# Patient Record
Sex: MALE | Race: WHITE | URBAN - METROPOLITAN AREA
[De-identification: names, ages, dates, MRNs, and addresses within clinical notes are randomized per-mention and may not be internally consistent; named-entity substitution may affect disease eponyms.]

---

## 2018-02-12 ENCOUNTER — TRANSFERRED RECORDS (OUTPATIENT)
Dept: HEALTH INFORMATION MANAGEMENT | Facility: CLINIC | Age: 28
End: 2018-02-12

## 2018-08-16 ENCOUNTER — TELEPHONE (OUTPATIENT)
Dept: RHEUMATOLOGY | Facility: CLINIC | Age: 28
End: 2018-08-16

## 2018-08-16 NOTE — TELEPHONE ENCOUNTER
[facility]    Phone Message    May a detailed message be left on voicemail: yes    Reason for Call: Symptoms or Concerns     If patient has red-flag symptoms, warm transfer to triage line    Current symptom or concern: Ulcerative Colitis    Symptoms have been present for:  several years(s)    Has patient previously been seen for this? Yes    By Out of State    Date: 8/16/18    Are there any new or worsening symptoms? No      Action Taken: Message routed to:  [facility]): Pt would like to be seen in the Rheum clinic for Ulcerative Colitis - Med. records are being Faxed to the clinic by the [facility] - I explained the review process that he will get a call for the intake then at the end of the review, approx. 3 weeks will get a second call from the clinic

## 2018-08-21 NOTE — TELEPHONE ENCOUNTER
The Rheumatology Clinic is only accepting physician referrals at this time. If the patient would like to be seen in our clinic, they will need to contact their physician to obtain a referral and send it to the clinic. Left a message of this informatioon.  Dari Chinchilla CMA  8/21/2018 3:24 PM

## 2018-08-30 NOTE — TELEPHONE ENCOUNTER
[facility] Call [facility]    Phone Message    May a detailed message be left on voicemail: yes    Reason for Call: Other: Per pt, just following up on referral. Aside from the [facility], he stated [facility] is sending some medical records and referral as well. Please follow up.     Action Taken: Message routed to:  [facility] ([facility]): Rheum

## 2018-08-31 NOTE — TELEPHONE ENCOUNTER
Call was placed to patient regarding the referral we received for ulcerative colitis from Jose Morrison at [facility], however, there was no answer. Message was left asking patient to call the clinic back to complete an intake form and discuss if there are outside records needed. Awaiting a call back from the patient.  Dari Chinchilla CMA  8/31/2018 2:30 PM

## 2018-09-04 NOTE — TELEPHONE ENCOUNTER
[facility]    Phone Message    May a detailed message be left on voicemail: yes    Reason for Call: Other: Per pt, returning Dari's call. Please follow up at your earliest convenience.     Action Taken: Message routed to:  [facility]): Rheum

## 2018-09-06 NOTE — TELEPHONE ENCOUNTER
[facility]    Phone Message    May a detailed message be left on voicemail: yes    Reason for Call: Other: The pt is returning Dari's call for an intake. He is in meetings until 3:30 pm today - could you call after 3:30 today? Thanks! \     Action Taken: Message routed to:  [facility]): [facility] rheum

## 2018-09-07 NOTE — TELEPHONE ENCOUNTER
[facility]    Phone Message    May a detailed message be left on voicemail: yes    Reason for Call: Other: Pt returned Dari's call, this was his 4th call but I was told to take a message. Please call him as soon as possible on Mon.     Action Taken: Message routed to:  [facility]): see above

## 2018-09-10 NOTE — TELEPHONE ENCOUNTER
[facility]    Phone Message    May a detailed message be left on voicemail: yes    Reason for Call: patient is calling back returning a call from Dari, He wanted to let you know he is in the middle of a flair up and would like to be seen soon if possible     Action Taken: Message routed to:  [facility]): RHEUM

## 2018-09-10 NOTE — TELEPHONE ENCOUNTER
Spoke with patient and let know that this may not be something that we see in Rheumatology and we will need his records to review. Patient stated that all his care for this was done at the [facility] which we are able to view via Care Everywhere. I have emailed a EUNICE so this can be completed. Patient agreed with this plan.  Dari Chinchilla CMA  9/10/2018 4:24 PM

## 2018-09-10 NOTE — TELEPHONE ENCOUNTER
[facility]    Phone Message    May a detailed message be left on voicemail: yes    Reason for Call: Other: Patient is calling to try and catch Dari before the end of the day, please call to follow up for intake.      Action Taken: Message routed to:  [facility] Rheum

## 2018-09-10 NOTE — TELEPHONE ENCOUNTER
Call placed to patient regarding the referral without success. Message left.   Dari Chinchilla CMA  9/10/2018 3:16 PM

## 2018-09-11 NOTE — TELEPHONE ENCOUNTER
Called patient regarding the referral without success. I have received the EUNICE via email. I have some questions that I would like to discuss with the patient. Awaiting a callback from patient.   Dari Chinchilla CMA  9/11/2018 12:14 PM

## 2018-09-11 NOTE — TELEPHONE ENCOUNTER
[facility]    Phone Message    May a detailed message be left on voicemail: yes    Reason for Call: Other: Pt returning call from Dari. He states he is available between 12:30 and 1:30, and is available again after 3:30. Please advise.     Action Taken: Message routed to:  [facility] Rheum

## 2018-09-12 NOTE — TELEPHONE ENCOUNTER
Per our Rheumatology Team, this diagnosis is not something that our providers manage unless there is joint pain involved. I spoke to patient and inquired of this and he stated that he does not. I let him know that his provider will have to refer him to GI for management of the ulcerative colitis.   Dari Chinchilla CMA  9/12/2018 3:42 PM

## 2018-09-14 ENCOUNTER — TELEPHONE (OUTPATIENT)
Dept: GASTROENTEROLOGY | Facility: CLINIC | Age: 28
End: 2018-09-14

## 2018-09-14 NOTE — TELEPHONE ENCOUNTER
[facility] Call Center    Phone Message    May a detailed message be left on voicemail: yes    Reason for Call: Other: Patient is scheduled first available for Ulcerative Colitis. [place] and [place] care. Patient has been trying to get into the clinic since August and was incorrectly routed to the wrong department, only to be told that they do not see for this diagnosis. Patient is currently having a flare up and at no fault of his own is just now getting scheduled. Patient needs to be seen within a month per guidelines, but would like to be seen sooner if possible. Please call patient .      Action Taken: Message routed to:  [facility] GI

## 2018-09-17 ENCOUNTER — TELEPHONE (OUTPATIENT)
Dept: GASTROENTEROLOGY | Facility: CLINIC | Age: 28
End: 2018-09-17

## 2018-09-18 ENCOUNTER — TELEPHONE (OUTPATIENT)
Dept: GASTROENTEROLOGY | Facility: CLINIC | Age: 28
End: 2018-09-18

## 2018-09-18 ENCOUNTER — OFFICE VISIT (OUTPATIENT)
Dept: GASTROENTEROLOGY | Facility: CLINIC | Age: 28
End: 2018-09-18
Payer: COMMERCIAL

## 2018-09-18 VITALS
TEMPERATURE: 97.9 F | OXYGEN SATURATION: 98 % | DIASTOLIC BLOOD PRESSURE: 74 MMHG | RESPIRATION RATE: 16 BRPM | SYSTOLIC BLOOD PRESSURE: 125 MMHG | HEART RATE: 69 BPM | BODY MASS INDEX: 26 KG/M2 | WEIGHT: 196.2 LBS | HEIGHT: 73 IN

## 2018-09-18 DIAGNOSIS — K51.911 ULCERATIVE COLITIS WITH RECTAL BLEEDING, UNSPECIFIED LOCATION (H): ICD-10-CM

## 2018-09-18 DIAGNOSIS — Z92.241 H/O SYSTEMIC STEROID THERAPY: ICD-10-CM

## 2018-09-18 DIAGNOSIS — K51.911 ULCERATIVE COLITIS WITH RECTAL BLEEDING, UNSPECIFIED LOCATION (H): Primary | ICD-10-CM

## 2018-09-18 LAB
ALBUMIN SERPL-MCNC: 4.2 G/DL (ref 3.4–5)
ALP SERPL-CCNC: 38 U/L (ref 40–150)
ALT SERPL W P-5'-P-CCNC: 25 U/L (ref 0–70)
ANION GAP SERPL CALCULATED.3IONS-SCNC: 5 MMOL/L (ref 3–14)
AST SERPL W P-5'-P-CCNC: 9 U/L (ref 0–45)
BASOPHILS # BLD AUTO: 0 10E9/L (ref 0–0.2)
BASOPHILS NFR BLD AUTO: 0.3 %
BILIRUB DIRECT SERPL-MCNC: 0.3 MG/DL (ref 0–0.2)
BILIRUB SERPL-MCNC: 1.2 MG/DL (ref 0.2–1.3)
BUN SERPL-MCNC: 18 MG/DL (ref 7–30)
CALCIUM SERPL-MCNC: 9 MG/DL (ref 8.5–10.1)
CHLORIDE SERPL-SCNC: 104 MMOL/L (ref 94–109)
CO2 SERPL-SCNC: 30 MMOL/L (ref 20–32)
CREAT SERPL-MCNC: 0.9 MG/DL (ref 0.66–1.25)
CRP SERPL-MCNC: <2.9 MG/L (ref 0–8)
DIFFERENTIAL METHOD BLD: NORMAL
EOSINOPHIL # BLD AUTO: 0 10E9/L (ref 0–0.7)
EOSINOPHIL NFR BLD AUTO: 0.1 %
ERYTHROCYTE [DISTWIDTH] IN BLOOD BY AUTOMATED COUNT: 12.6 % (ref 10–15)
ERYTHROCYTE [SEDIMENTATION RATE] IN BLOOD BY WESTERGREN METHOD: 3 MM/H (ref 0–15)
GFR SERPL CREATININE-BSD FRML MDRD: >90 ML/MIN/1.7M2
GLUCOSE SERPL-MCNC: 99 MG/DL (ref 70–99)
HCT VFR BLD AUTO: 45.5 % (ref 40–53)
HGB BLD-MCNC: 15.1 G/DL (ref 13.3–17.7)
IMM GRANULOCYTES # BLD: 0.1 10E9/L (ref 0–0.4)
IMM GRANULOCYTES NFR BLD: 0.6 %
LYMPHOCYTES # BLD AUTO: 1.2 10E9/L (ref 0.8–5.3)
LYMPHOCYTES NFR BLD AUTO: 13.2 %
MCH RBC QN AUTO: 30.9 PG (ref 26.5–33)
MCHC RBC AUTO-ENTMCNC: 33.2 G/DL (ref 31.5–36.5)
MCV RBC AUTO: 93 FL (ref 78–100)
MONOCYTES # BLD AUTO: 0.8 10E9/L (ref 0–1.3)
MONOCYTES NFR BLD AUTO: 8.6 %
NEUTROPHILS # BLD AUTO: 6.9 10E9/L (ref 1.6–8.3)
NEUTROPHILS NFR BLD AUTO: 77.2 %
NRBC # BLD AUTO: 0 10*3/UL
NRBC BLD AUTO-RTO: 0 /100
PLATELET # BLD AUTO: 181 10E9/L (ref 150–450)
POTASSIUM SERPL-SCNC: 4.4 MMOL/L (ref 3.4–5.3)
PROT SERPL-MCNC: 7.3 G/DL (ref 6.8–8.8)
RBC # BLD AUTO: 4.88 10E12/L (ref 4.4–5.9)
SODIUM SERPL-SCNC: 139 MMOL/L (ref 133–144)
WBC # BLD AUTO: 8.9 10E9/L (ref 4–11)

## 2018-09-18 RX ORDER — MESALAMINE 800 MG/1
2400 TABLET, DELAYED RELEASE ORAL
COMMUNITY
End: 2018-11-20

## 2018-09-18 ASSESSMENT — PAIN SCALES - GENERAL: PAINLEVEL: NO PAIN (0)

## 2018-09-18 ASSESSMENT — ENCOUNTER SYMPTOMS
DIARRHEA: 0
HEARTBURN: 0
BOWEL INCONTINENCE: 1
CONSTIPATION: 0
RECTAL PAIN: 0
NAUSEA: 0
BLOATING: 0
VOMITING: 0
BLOOD IN STOOL: 1
JAUNDICE: 0
ABDOMINAL PAIN: 0

## 2018-09-18 NOTE — PROGRESS NOTES
[facility] UC NEW       PATIENT: Derek Otto Jr.    MRN: [id number]    Date of Birth 1990    Tel: [phone] (home)     PCP: No Ref-Primary, Physician     HPI: Mr. Otto is a 27 year old year old male here to establish care for UC; he recently moved to [place] from [place].     UC history  [place] year of [place] ([place] developed diarrhea (may not have been bloody) and lost 20 lb. Presented to PCP who advised a liquid diet, which did not help. Was seen by a GI doc in [place] (Dr. Bacon) who performed a cscope that showed UC (extent and severity unknown).  Started on prednisone and Asacol. That summer, things improved. In the Fall, he had worsening symptoms and 6MP was started + Asacol +prednisone.  In 2012, saw Dr. Jay Jay Ayala at [facility] who lowered the 6MP dose. Symptoms were generally manageable until a flare in early 2014, restarted prednisone. In 6/2014 started the SCD (+ medications) and did well. Cscope in 2016 looked good. Flared in 2017 and 6/2018 and now, all managed with prednisone.  Currently on 30 mg prednisone, scheduled to decrease to 20 mg on Monday.     [place]aine moved to [place] 7/1/2018 (g/f is a med/peds resident here).       PCP located at [facility].     Current UC symptoms  Bowel frequency in day 1-2 (1 week ago was 3-6 times per day, with a lot of blood and urgency)  Bowel frequency in night 0  Urgency of defecation YES some  Blood in stool minimal   General well being 1 = slightly below average  Extracolonic features (multiple select) none    Constitutional symptoms:  Fever NO  Weight loss NO    Other GI symptoms present non    Total number of IBD surgeries (except perianal): 0    Current IBD Medications:  6MP 50 mg daily  Prednisone 30 mg daily  Asacol 4.8 g daily    Past IBD Medications:   6MP 50 mg daily  Prednisone 30 mg daily  Asacol 4.8 g daily    Past Medical History:   Diagnosis Date     Ulcerative colitis (H)         Past  "Surgical History:   Procedure Laterality Date     HC TOOTH EXTRACTION W/FORCEP  2005       Social History   Substance Use Topics     Smoking status: Never Smoker     Smokeless tobacco: Never Used     Alcohol use 1.2 - 3.6 oz/week     1 - 3 Shots of liquor, 1 - 3 Glasses of wine per week      Comment: Socially       Family History   Problem Relation Age of Onset     Ulcerative Colitis Maternal Grandfather      Autoimmune Disease No family hx of      Colon Cancer No family hx of        Allergies   Allergen Reactions     Erythromycin Rash     Patient reports happened once as a child.        Outpatient Encounter Prescriptions as of 9/18/2018   Medication Sig Dispense Refill     MERCAPTOPURINE PO Take 50 mg by mouth       mesalamine (ASACOL HD) 800 MG EC tablet Take 2,400 mg by mouth 2 times daily       PREDNISONE PO Take 30 mg by mouth daily       No facility-administered encounter medications on file as of 9/18/2018.       NSAID  NO    Review of Systems  Complete 10 System ROS performed. All are negative except as documented below, in the HPI, or in patient questionnaire from today's visit.    1) Constitutional: No fevers, chills, night sweats or malaise, weight loss or gain  2) Skin: No rash  3) Pulmonary: No wheeze, SOB, cough, sputum or hemoptysis  4) Cardiovascular: No Chest pain or palpitations  5) Genitourinary: No blood in urine or dysuria  6) Endocrine: No increased sweating, hunger, thirst or thyroid problems  7) Hematologic: No bruising and easy bleeding  8) Musculoskeletal: no new pain in joints or limitation in ROM  9) Neurologic: No dizziness, paresthesias or weakness or falls  10) Psychiatric:  not depressed/anxious, no sleep problems    PHYSICAL EXAM  Vitals: /74 (BP Location: Left arm, Patient Position: Sitting, Cuff Size: Adult Large)  Pulse 69  Temp 97.9  F (36.6  C) (Oral)  Resp 16  Ht 1.855 m (6' 1.03\")  Wt 89 kg (196 lb 3.2 oz)  SpO2 98%  BMI 25.86 kg/m2    No Pain (0)      Eyes: " Sclera anicteric/injected  Ears/nose/mouth/throat: Normal oropharynx without ulcers or exudate, mucus membranes moist, hearing intact  Neck: supple, thyroid normal size  CV: No edema, RRR  Respiratory: Unlabored breathing  Lymph: No axillary, submandibular, supraclavicular or inguinal lymphadenopathy  Abd:  Nondistended, +bs, no hepatosplenomegaly, nontender, no peritoneal signs  Skin: warm, perfused, no jaundice  Psych: Normal affect  MSK: Normal gait     DATA:  Reviewed in detail past documentation, medications and prior workup available in electronic health records or through outside records.    PERTINENT STUDIES:  Endoscopy:   Not available  Last cscope in 2016 (Dr. Ayala, [facility]) -- showed healing.     Imaging:  May have had an MRE ([place], [place]) in ~2015. Not sure result.     IMPRESSION:  Mr. Otto is a 27 year old year old here with UC, currently experiencing a flare that is responsive to steroids.  He is on 6MP and Asacol as well.  We will plan to expedite a colonoscopy for disease severity and extent assessment. Will continue prednisone taper for now. Will start Entyvio, given he has required several prednisone courses and is clearly refractory to his current regimen of 6MP/5-ASA. Of note, he 6MP dose is low but he would like to get off this medication anyway, given risk for hepatosplenic T cell lymphoma in young males.       # Moderate/severe UC on 6MP/5-ASA, diagnosed 6 years ago with unknown extent, currently with steroid responsive flare    PLAN:  --Referral for ileocolonoscopy (scheduled for Monday)  --Labs today  --Stool studies + fecal calprotectin  --Continue prednisone taper   --Start Entyvio. Will overlap with 6MP for now.  --Referral to Scott Yeboah, PhD for nutrition   --DEXA bone scan given history of long term prednisone use (cumulative more than 3 months)  --Get flu shot this season, also get the pneumococcal vaccines (13 + 23)  --Establish care with PCP at the  to avoid disjointed  care  --We will obtain outside records    The information and safety profile of Vedolizumab (Entyvio) was reviewed with the patient: fixed dose of 300 mg given via IV infusion over 30 minutes. Induction dosing is administered at week 0, 2, and 6 and then every 8 weeks for maintenance therapy. Except for possible nasalpharyngitis, there is no increased risk of serious infections. Patient advised to take normal precautions in preventing colds: washing hands and keeping hands away from face. Additionally, there is no increased risk of cancer. Lastly, although some superficial similarity to Tysabri, there have been no cases of progressive multifocal leukoencephalopathy (PML) seen in clinical trials and most likely no risk.    Misc:  -- Continue to avoid tobacco use  -- Avoid NSAIDs as there is potentially a 25% chance of causing an IBD flare    Return in about 8 weeks (around 11/13/2018). with myself or YASMANY Sun MD   of Medicine  Division of Gastroenterology, Hepatology and Nutrition  [facility]    September 18, 2018    I spent a total of 60 minutes, face to face, was spent with this patient, >50% of which was counseling regarding the above delineated issues.

## 2018-09-18 NOTE — PATIENT INSTRUCTIONS
PLAN  --Referral for ileocolonoscopy  --Labs  --Stool studies + fecal calprotectin  --Continue prednisone taper   --Start Entyvio. Will overlap with 6MP for now.  --We will obtain outside records  --Referral to Scott Yeboah, PhD for nutrition   --DEXA bone scan  --Get flu shot this season, also get the pneumococcal vaccines (13 + 23)  --Establish care with PCP at the   --Sign up for MyChart    Return for follow up in 6-8 weeks with myself or with YASMANY Sun.     For questions regarding your care Monday through Friday, contact the RN GI care coordinator, Sapna Guzman at [phone] and your call will be returned within 24 hours. If you have a more immediate medical need call   [phone] . Your call will be returned same day, or if consultation is needed with the provider, it may be following business day. You may also send  a My Chart message to your provider and it will be answered in a timely fashion. If in need of assistance after hours, holidays or weekends please call [phone] and have the on call GI fellow paged.        For medication refills (prescribed by the GI clinic), contact your pharmacy.  In order for your refill to be processed in a timely fashion, it is your responsibility to ensure you follow the recommendations from your provider regarding your laboratory studies and follow up appointments     For appointment rescheduling/cancellation, contact [phone]      After hours, holidays, or if you have an immediate GI concern and cannot wait for a return call, contact the GI Fellow at [phone] and select option #4.           To reschedule your colonoscopy:      [phone] - [facility]  [address] , [address]  ([facility] [place] [place] [address]   [phone]  QUESTIONS ABOUT YOUR PREP CALL BRIANNE [phone]                                                      DIRECTIONS FOR COLON PREP PRIOR TO COLONOSCOPY:    One week before your  colonoscopy:    Your  Prep will be  sent to pharmacy of choice    - Talk to your primary  doctor if you take blood thinners such as aspirin, Coumadin, or Plavix.  This medication may need to be changed before the test.  If you have diabetes, ask your doctor if you should change your diet or medications.    - If you have diabetes, ask to have your exam early in the morning.     - Stop taking fiber supplements, multi-vitamins with iron and medicines that contain iron.    -You may also want to buy Tucks wipes, Vaseline, or other items. (see below-Tips for Colon Cleansing).    - Arrange for a  to take you to and from the procedure.    Three days before your colonoscopy:    - Begin a low fiber diet.  No raw fruits or vegetables, whole wheat, popcorn or other high fiber foods.  (see chart below).  Stop bulking agents such as bran, Metamucil, Fibercon.  No olestra (fat substitute).    Two days before your colonoscopy:    - Drink at least 4-6 glasses of sports drink (no red or purple liquids) each day.    - Stop taking these medicines:  ibuproprofen (Advil, Motrin), Clinoril, Feldene, Naprosyn, Aleve or other NSAID medication.  You may take Tylenol for pain.    The day before your colonoscopy:    - No more solid food.  Begin drinking clear liquids (see list below).  Drink at least 8-10 full glasses of clear liquids during the day.    -LOW FIBER DIET:    Starches:  White bread, rolls, biscuits, croissants, Chelsey toast, white flour tortillas, waffles, pancakes, white Citizen of [place] toast, white rice, noodles, pasta, macaroni, cooked and peeled potatoes, plain crackers, saltines, cooked farina or cream of rice, puffed rice, corn flakes, Rice Krispies, Special K.    Vegetables:  Tender cooked and canned vegetables without seeds, carrots, asparagus tips, green beans, wax beans, spinach, vegetable broth.  NO raw vegetables.    Fruits and fruit juices:  Strained fruit juice, canned fruit without skin or seeds (no pineapple),  applesauce, pear sauce, ripe bananas, melons (no watermelon)    Milk products:  Milk (plain or flavored), cheese, cottage cheese, yogurt (no berries), custard, ice cream (no nuts)    Proteins:  Tender, well-cooked ground beef, lamb, veal, ham, pork, chicken, turkey, fish, or organ meats, eggs, creamy peanut butter    Fats and Condiments:  Margarine, butter, mayonnaise, sour cream, salad dressing, plain gravy, spices, cooked herbs, bouillon, sugar, clear jelly, honey, syrup    Snacks, sweets, and drinks: Pretzels, hard candy, plain cakes and cookies (no nuts or seeds), jello, plain pudding, sherbet, Popsicles, coffee, tea, soda, pop.    CLEAR LIQUID DIET:    Water, tea, coffee (no milk or cream)    Soda pop, Gatorade or other sports drinks (no red or purple)    Clear nutrition drinks (Enlive, Resource Breeze)    Jello, Popsicles (no milk or fruit pieces), sorbet (no red or purple)    Fat free soup broth or bouillon    Plain hard candy, such as lifesavers (no red or purple)    Clear juices and fruit flavored drinks such as apple juice, white grape juice, Hi-C, Molina-Aid (no red or purple)    TIPS FOR COLON CLEANSING:    - Use alcohol free baby wipes to ease anal irritation.  You may also use Desitin cream or Vaseline to help protect the skin. Other options include Tucks wipes, hemorrhoid creams, or hydrocortisone cream.    - You will have diarrhea, and may also have chills.  Dress comfortably.    - Expect to feel discomfort until the stool clears from your colon.    - If you followed the instructions and your stool is clear or yellow liquid, you are ready for the exam.    - If you are not sure if your colon is clean, please call the numbers above and ask to speak to an endoscopy nurse.      MIRALAX/GATORADE    FIVE DAYS BEFORE    -Stop taking any of the following over-the-counter medications: Ibuprofen, Asprin,Iron supplements.    -If you are taking a blood thinner medication such as Coumadin, Plavix, or Warfarin, you  MUST contact the prescribing physician regarding clearance for this procedure, or instructions for stopping/changing this medication before your procedure. -Please contact the nurse line at [phone] option #3 for any questions regarding other medications.       THREE DAYS BEFORE     Begin restricted low-residue diet.  Suggested foods include: white bread or rolls, plain crackers, white rice, skinless potatoes, low fiber cereals, chicken, turkey, fish or seafood, and eggs.  You may also have applesauce, pear sauce, soft honeydew, cantaloupe, ripe banana, canned fruit without seeds or skins.      Do not eat: Corn (any form), raw vegetables, foods with seeds, whole wheat or grain breads and cereals, brown or wild rice, granola, raisins and dried fruit, berries, popcorn, all varieties of nuts, peanuts, and seeds.     Please go to your local pharmacy or store and purchase:    - 8.3 oz bottle of  Miralax (Powder)  - 10 oz bottle of Magnesium Citrate  -  64 oz of Gatorade (no red or purple)    THE DAY BEFORE YOUR PROCEDURE    - Begin allowed clear liquid diet at breakfast time.    - Mix Miralax and 64 oz. of Gatorade in a pitcher, and place in the fridge.      ALLOWED CLEAR LIQUIDS  -Water    -Regular or decaf coffee (no cream)    -Jell-O or popsicles (no red)    -Plain hard candy (no red)    -Clear juices or Gatorade (no red or purple)    -Chicken broth (no vegetables or noodles)    DO NOT DRINK  -Milk or mild products such as ice cream, malts, or shakes    -Red juices of any kind    -Molina-aid, cranberry, or grape juice    -Juice with pulp (orange, grapefruit, pineapple,, or tomato)    -Cream soups of any kind    -Alcoholic beverages    4:00 P.M.- 6:00 P.M.-  Drink one eight (8) ounce glass of Miralax/Gatorade mixture every 30 minutes until the mixture is gone.  If you start to feel nauseous, you may space out your drinking intervals to every 45 minutes    8:00 P.M.- 10:00 P.M.- Drink the bottle of Magnesium  Citrate.    You may have clear liquids up to 3 hours before procedure time          .

## 2018-09-18 NOTE — TELEPHONE ENCOUNTER
HANANE for medical records team at the [facility] in regards to patient records. Asked medical records team to return my phone call so we can get proper records sent to our clinic.

## 2018-09-18 NOTE — NURSING NOTE
"Chief Complaint   Patient presents with     Consult     Ulcerative Colitis       Vitals:    09/18/18 1500   BP: 125/74   BP Location: Left arm   Patient Position: Sitting   Cuff Size: Adult Large   Pulse: 69   Resp: 16   Temp: 97.9  F (36.6  C)   TempSrc: Oral   SpO2: 98%   Weight: 89 kg (196 lb 3.2 oz)   Height: 1.855 m (6' 1.03\")       Body mass index is 25.86 kg/(m^2).      Pebbles Gamble LPN                          "

## 2018-09-18 NOTE — LETTER
9/18/2018       RE: Derek Otto Jr.  [address]  [address]  [place] [facility] [address]     Dear Colleague,    Thank you for referring your patient, Derek Otto Jr., to the [facility] GASTROENTEROLOGY AND IBD CLINIC at [facility]. Please see a copy of my visit note below.    [facility] UC NEW       PATIENT: Derek Otto Jr.    MRN: [id number]    Date of Birth 1990    Tel: [phone] (home)     PCP: No Ref-Primary, Physician     HPI: Mr. Otto is a 27 year old year old male here to establish care for UC; he recently moved to [place] from [place].     UC history  [place] year of [place] ([employer] [place] developed diarrhea (may not have been bloody) and lost 20 lb. Presented to PCP who advised a liquid diet, which did not help. Was seen by a GI doc in [place] (Dr. Bacon) who performed a cscope that showed UC (extent and severity unknown).  Started on prednisone and Asacol. That summer, things improved. In the Fall, he had worsening symptoms and 6MP was started + Asacol +prednisone.  In 2012, saw Dr. Jay Jay Ayala at [facility] who lowered the 6MP dose. Symptoms were generally manageable until a flare in early 2014, restarted prednisone. In 6/2014 started the SCD (+ medications) and did well. Cscope in 2016 looked good. Flared in 2017 and 6/2018 and now, all managed with prednisone.  Currently on 30 mg prednisone, scheduled to decrease to 20 mg on Monday.     D[place]aine moved to [place] 7/1/2018 (g/f is a med/peds resident here).       PCP located at [facility].     Current UC symptoms  Bowel frequency in day 1-2 (1 week ago was 3-6 times per day, with a lot of blood and urgency)  Bowel frequency in night 0  Urgency of defecation YES some  Blood in stool minimal   General well being 1 = slightly below average  Extracolonic features (multiple select) none    Constitutional symptoms:  Fever NO  Weight loss NO    Other GI symptoms present  non    Total number of IBD surgeries (except perianal): 0    Current IBD Medications:  6MP 50 mg daily  Prednisone 30 mg daily  Asacol 4.8 g daily    Past IBD Medications:   6MP 50 mg daily  Prednisone 30 mg daily  Asacol 4.8 g daily    Past Medical History:   Diagnosis Date     Ulcerative colitis (H)         Past Surgical History:   Procedure Laterality Date     HC TOOTH EXTRACTION W/FORCEP  2005       Social History   Substance Use Topics     Smoking status: Never Smoker     Smokeless tobacco: Never Used     Alcohol use 1.2 - 3.6 oz/week     1 - 3 Shots of liquor, 1 - 3 Glasses of wine per week      Comment: Socially       Family History   Problem Relation Age of Onset     Ulcerative Colitis Maternal Grandfather      Autoimmune Disease No family hx of      Colon Cancer No family hx of        Allergies   Allergen Reactions     Erythromycin Rash     Patient reports happened once as a child.        Outpatient Encounter Prescriptions as of 9/18/2018   Medication Sig Dispense Refill     MERCAPTOPURINE PO Take 50 mg by mouth       mesalamine (ASACOL HD) 800 MG EC tablet Take 2,400 mg by mouth 2 times daily       PREDNISONE PO Take 30 mg by mouth daily       No facility-administered encounter medications on file as of 9/18/2018.       NSAID  NO    Review of Systems  Complete 10 System ROS performed. All are negative except as documented below, in the HPI, or in patient questionnaire from today's visit.    1) Constitutional: No fevers, chills, night sweats or malaise, weight loss or gain  2) Skin: No rash  3) Pulmonary: No wheeze, SOB, cough, sputum or hemoptysis  4) Cardiovascular: No Chest pain or palpitations  5) Genitourinary: No blood in urine or dysuria  6) Endocrine: No increased sweating, hunger, thirst or thyroid problems  7) Hematologic: No bruising and easy bleeding  8) Musculoskeletal: no new pain in joints or limitation in ROM  9) Neurologic: No dizziness, paresthesias or weakness or falls  10) Psychiatric:   "not depressed/anxious, no sleep problems    PHYSICAL EXAM  Vitals: /74 (BP Location: Left arm, Patient Position: Sitting, Cuff Size: Adult Large)  Pulse 69  Temp 97.9  F (36.6  C) (Oral)  Resp 16  Ht 1.855 m (6' 1.03\")  Wt 89 kg (196 lb 3.2 oz)  SpO2 98%  BMI 25.86 kg/m2    No Pain (0)      Eyes: Sclera anicteric/injected  Ears/nose/mouth/throat: Normal oropharynx without ulcers or exudate, mucus membranes moist, hearing intact  Neck: supple, thyroid normal size  CV: No edema, RRR  Respiratory: Unlabored breathing  Lymph: No axillary, submandibular, supraclavicular or inguinal lymphadenopathy  Abd:  Nondistended, +bs, no hepatosplenomegaly, nontender, no peritoneal signs  Skin: warm, perfused, no jaundice  Psych: Normal affect  MSK: Normal gait     DATA:  Reviewed in detail past documentation, medications and prior workup available in electronic health records or through outside records.    PERTINENT STUDIES:  Endoscopy:   Not available  Last cscope in 2016 (Dr. Ayala, [facility]) -- showed healing.     Imaging:  May have had an MRE ([place], [place]) in ~2015. Not sure result.     IMPRESSION:  Mr. Otto is a 27 year old year old here with UC, currently experiencing a flare that is responsive to steroids.  He is on 6MP and Asacol as well.  We will plan to expedite a colonoscopy for disease severity and extent assessment. Will continue prednisone taper for now. Will start Entyvio, given he has required several prednisone courses and is clearly refractory to his current regimen of 6MP/5-ASA. Of note, he 6MP dose is low but he would like to get off this medication anyway, given risk for hepatosplenic T cell lymphoma in young males.       # Moderate/severe UC on 6MP/5-ASA, diagnosed 6 years ago with unknown extent, currently with steroid responsive flare    PLAN:  --Referral for ileocolonoscopy (scheduled for Monday)  --Labs today  --Stool studies + fecal calprotectin  --Continue prednisone taper   --Start " Entyvio. Will overlap with 6MP for now.  --Referral to Scott Yeboah, PhD for nutrition   --DEXA bone scan given history of long term prednisone use (cumulative more than 3 months)  --Get flu shot this season, also get the pneumococcal vaccines (13 + 23)  --Establish care with PCP at the  to avoid disjointed care  --We will obtain outside records    The information and safety profile of Vedolizumab (Entyvio) was reviewed with the patient: fixed dose of 300 mg given via IV infusion over 30 minutes. Induction dosing is administered at week 0, 2, and 6 and then every 8 weeks for maintenance therapy. Except for possible nasalpharyngitis, there is no increased risk of serious infections. Patient advised to take normal precautions in preventing colds: washing hands and keeping hands away from face. Additionally, there is no increased risk of cancer. Lastly, although some superficial similarity to Tysabri, there have been no cases of progressive multifocal leukoencephalopathy (PML) seen in clinical trials and most likely no risk.    Misc:  -- Continue to avoid tobacco use  -- Avoid NSAIDs as there is potentially a 25% chance of causing an IBD flare    Return in about 8 weeks (around 11/13/2018). with myself or YASMANY Sun MD   of Medicine  Division of Gastroenterology, Hepatology and Nutrition  [facility]    September 18, 2018    I spent a total of 60 minutes, face to face, was spent with this patient, >50% of which was counseling regarding the above delineated issues.              Again, thank you for allowing me to participate in the care of your patient.      Sincerely,    Dorys Ruiz MD

## 2018-09-18 NOTE — MR AVS SNAPSHOT
After Visit Summary   9/18/2018    Derek Otto Jr.    MRN: [id number]           Patient Information     Date Of Birth          1990        Visit Information        Provider Department      9/18/2018 3:00 PM Dorys Ruiz MD [facility] Gastroenterology and IBD Clinic        Today's Diagnoses     Ulcerative colitis with rectal bleeding, unspecified location (H)    -  1    H/O systemic steroid therapy          Care Instructions    PLAN  --Referral for ileocolonoscopy  --Labs  --Stool studies + fecal calprotectin  --Continue prednisone taper   --Start Entyvio. Will overlap with 6MP for now.  --We will obtain outside records  --Referral to Scott Yeboah, PhD for nutrition   --DEXA bone scan  --Get flu shot this season, also get the pneumococcal vaccines (13 + 23)  --Establish care with PCP at the   --Sign up for MyChart    Return for follow up in 6-8 weeks with myself or with YASMANY Sun.     For questions regarding your care Monday through Friday, contact the RN GI care coordinator, Sapna Guzman at [phone] and your call will be returned within 24 hours. If you have a more immediate medical need call   [phone] . Your call will be returned same day, or if consultation is needed with the provider, it may be following business day. You may also send  a My Chart message to your provider and it will be answered in a timely fashion. If in need of assistance after hours, holidays or weekends please call [phone] and have the on call GI fellow paged.        For medication refills (prescribed by the GI clinic), contact your pharmacy.  In order for your refill to be processed in a timely fashion, it is your responsibility to ensure you follow the recommendations from your provider regarding your laboratory studies and follow up appointments     For appointment rescheduling/cancellation, contact [phone]      After hours, holidays, or if you have an immediate GI concern and cannot  wait for a return call, contact the GI Fellow at [phone] and select option #4.           To reschedule your colonoscopy:      [phone] - [facility] campus  [address]  ([address] [place] [address])  [place] [address]   [phone]  QUESTIONS ABOUT YOUR PREP CALL BRIANNE [phone]                                                      DIRECTIONS FOR COLON PREP PRIOR TO COLONOSCOPY:    One week before your colonoscopy:    Your  Prep will be  sent to pharmacy of choice    - Talk to your primary  doctor if you take blood thinners such as aspirin, Coumadin, or Plavix.  This medication may need to be changed before the test.  If you have diabetes, ask your doctor if you should change your diet or medications.    - If you have diabetes, ask to have your exam early in the morning.     - Stop taking fiber supplements, multi-vitamins with iron and medicines that contain iron.    -You may also want to buy Tucks wipes, Vaseline, or other items. (see below-Tips for Colon Cleansing).    - Arrange for a  to take you to and from the procedure.    Three days before your colonoscopy:    - Begin a low fiber diet.  No raw fruits or vegetables, whole wheat, popcorn or other high fiber foods.  (see chart below).  Stop bulking agents such as bran, Metamucil, Fibercon.  No olestra (fat substitute).    Two days before your colonoscopy:    - Drink at least 4-6 glasses of sports drink (no red or purple liquids) each day.    - Stop taking these medicines:  ibuproprofen (Advil, Motrin), Clinoril, Feldene, Naprosyn, Aleve or other NSAID medication.  You may take Tylenol for pain.    The day before your colonoscopy:    - No more solid food.  Begin drinking clear liquids (see list below).  Drink at least 8-10 full glasses of clear liquids during the day.    -LOW FIBER DIET:    Starches:  White bread, rolls, biscuits, croissants, Chelsey toast, white flour tortillas, waffles, pancakes, white Mongolian  toast, white rice, noodles, pasta, macaroni, cooked and peeled potatoes, plain crackers, saltines, cooked farina or cream of rice, puffed rice, corn flakes, Rice Krispies, Special K.    Vegetables:  Tender cooked and canned vegetables without seeds, carrots, asparagus tips, green beans, wax beans, spinach, vegetable broth.  NO raw vegetables.    Fruits and fruit juices:  Strained fruit juice, canned fruit without skin or seeds (no pineapple), applesauce, pear sauce, ripe bananas, melons (no watermelon)    Milk products:  Milk (plain or flavored), cheese, cottage cheese, yogurt (no berries), custard, ice cream (no nuts)    Proteins:  Tender, well-cooked ground beef, lamb, veal, ham, pork, chicken, turkey, fish, or organ meats, eggs, creamy peanut butter    Fats and Condiments:  Margarine, butter, mayonnaise, sour cream, salad dressing, plain gravy, spices, cooked herbs, bouillon, sugar, clear jelly, honey, syrup    Snacks, sweets, and drinks: Pretzels, hard candy, plain cakes and cookies (no nuts or seeds), jello, plain pudding, sherbet, Popsicles, coffee, tea, soda, pop.    CLEAR LIQUID DIET:    Water, tea, coffee (no milk or cream)    Soda pop, Gatorade or other sports drinks (no red or purple)    Clear nutrition drinks (Enlive, Resource Breeze)    Jello, Popsicles (no milk or fruit pieces), sorbet (no red or purple)    Fat free soup broth or bouillon    Plain hard candy, such as lifesavers (no red or purple)    Clear juices and fruit flavored drinks such as apple juice, white grape juice, Hi-C, Molina-Aid (no red or purple)    TIPS FOR COLON CLEANSING:    - Use alcohol free baby wipes to ease anal irritation.  You may also use Desitin cream or Vaseline to help protect the skin. Other options include Tucks wipes, hemorrhoid creams, or hydrocortisone cream.    - You will have diarrhea, and may also have chills.  Dress comfortably.    - Expect to feel discomfort until the stool clears from your colon.    - If you  followed the instructions and your stool is clear or yellow liquid, you are ready for the exam.    - If you are not sure if your colon is clean, please call the numbers above and ask to speak to an endoscopy nurse.      MIRALAX/GATORADE    FIVE DAYS BEFORE    -Stop taking any of the following over-the-counter medications: Ibuprofen, Asprin,Iron supplements.    -If you are taking a blood thinner medication such as Coumadin, Plavix, or Warfarin, you MUST contact the prescribing physician regarding clearance for this procedure, or instructions for stopping/changing this medication before your procedure. -Please contact the nurse line at [phone] option #3 for any questions regarding other medications.       THREE DAYS BEFORE     Begin restricted low-residue diet.  Suggested foods include: white bread or rolls, plain crackers, white rice, skinless potatoes, low fiber cereals, chicken, turkey, fish or seafood, and eggs.  You may also have applesauce, pear sauce, soft honeydew, cantaloupe, ripe banana, canned fruit without seeds or skins.      Do not eat: Corn (any form), raw vegetables, foods with seeds, whole wheat or grain breads and cereals, brown or wild rice, granola, raisins and dried fruit, berries, popcorn, all varieties of nuts, peanuts, and seeds.     Please go to your local pharmacy or store and purchase:    - 8.3 oz bottle of  Miralax (Powder)  - 10 oz bottle of Magnesium Citrate  -  64 oz of Gatorade (no red or purple)    THE DAY BEFORE YOUR PROCEDURE    - Begin allowed clear liquid diet at breakfast time.    - Mix Miralax and 64 oz. of Gatorade in a pitcher, and place in the fridge.      ALLOWED CLEAR LIQUIDS  -Water    -Regular or decaf coffee (no cream)    -Jell-O or popsicles (no red)    -Plain hard candy (no red)    -Clear juices or Gatorade (no red or purple)    -Chicken broth (no vegetables or noodles)    DO NOT DRINK  -Milk or mild products such as ice cream, malts, or shakes    -Red juices of  any kind    -Molina-aid, cranberry, or grape juice    -Juice with pulp (orange, grapefruit, pineapple,, or tomato)    -Cream soups of any kind    -Alcoholic beverages    4:00 P.M.- 6:00 P.M.-  Drink one eight (8) ounce glass of Miralax/Gatorade mixture every 30 minutes until the mixture is gone.  If you start to feel nauseous, you may space out your drinking intervals to every 45 minutes    8:00 P.M.- 10:00 P.M.- Drink the bottle of Magnesium Citrate.    You may have clear liquids up to 3 hours before procedure time          .          Follow-ups after your visit        Additional Services     GASTROENTEROLOGY ADULT REF PROCEDURE ONLY       With Miralax prep                  Your next 10 appointments already scheduled     Sep 25, 2018  3:30 PM CDT   DX HIP/PELVIS/SPINE with UCDX1   [facility] ([facility])    [address] [place] [address]  [address] [place] [id number] [phone]           Please do not take any of the following 24 hours prior to the day of your exam: vitamins, calcium tablets, antacids.  If possible, please wear clothes without metal (snaps, zippers). A sweatsuit works well.            Oct 01, 2018   Procedure with Dorys Ruiz MD   [facility] ([facility])    [address] [place] [address]  [address] [place] [id number] [phone]           Located in the Clinics and Surgery Center at [address] [place] [address].   parking is very convenient and highly recommended.  is a $6 flat rate fee.  Both  and self parkers should enter the main arrival plaza from [facility]; parking attendants will direct you based on your parking preference.            Oct 16, 2018  3:40 PM CDT   (Arrive by 3:25 PM)   INFLAMMATORY BOWEL DISEASE with Dorys Ruiz MD   [facility] ([facility])    [address] [place] [address]  [address] [facility]  [address]   [phone]              Future tests that were ordered for you today     Open Future Orders        Priority Expected Expires Ordered    DX Hip/Pelvis/Spine Routine  9/18/2019 9/18/2018    CBC with platelets differential [UFV695] Routine 9/18/2018 11/17/2018 9/18/2018    Hepatic panel [LAB20] Routine 9/18/2018 11/17/2018 9/18/2018    CRP inflammation [CGX0263] Routine 9/18/2018 11/17/2018 9/18/2018    Erythrocyte sedimentation rate auto [UNR143] Routine 9/18/2018 11/17/2018 9/18/2018    Basic metabolic panel [LAB15] Routine 9/18/2018 11/17/2018 9/18/2018    Clostridium difficile toxin B PCR [OII4049] Routine 9/18/2018 11/17/2018 9/18/2018    Ova and Parasite Exam Routine [XTL3612] Routine 9/18/2018 11/17/2018 9/18/2018    Giardia antigen [WTV489] Routine 9/18/2018 11/17/2018 9/18/2018    Enteric Bacteria and Virus Panel by MELANIE Stool [OEO6774] Routine 9/18/2018 11/17/2018 9/18/2018    Vitamin D Deficiency [TMC522] Routine 9/18/2018 11/17/2018 9/18/2018    Calprotectin Feces [JOJ0651] Routine 9/18/2018 11/17/2018 9/18/2018    Hepatitis B Surface Antibody [MNA5434] Routine 9/18/2018 11/17/2018 9/18/2018    Hepatitis B surface antigen [ZQC292] Routine 9/18/2018 11/17/2018 9/18/2018    Hepatitis B core antibody [GKI0735] Routine 9/18/2018 11/17/2018 9/18/2018    Tuberculosis by Quantiferon (gold) [ELG8130] Routine 9/18/2018 11/17/2018 9/18/2018            Who to contact     Please call your clinic at [phone] to:    Ask questions about your health    Make or cancel appointments    Discuss your medicines    Learn about your test results    Speak to your doctor            Additional Information About Your Visit        [employer] Information     [employer] is an electronic gateway that provides easy, online access to your medical records. With [employer], you can request a clinic appointment, read your test results, renew a prescription or communicate with your care team.     To sign up for MyChart visit the  "website at [email or web address]   You will be asked to enter the access code listed below, as well as some personal information. Please follow the directions to create your username and password.     Your access code is: [email or web address]  Expires: 2018  6:34 AM     Your access code will  in 90 days. If you need help or a new code, please contact your [facility] or call [phone] for assistance.        Care EveryWhere ID     This is your Care EveryWhere ID. This could be used by other organizations to access your [facility] medical records  [id number]        Your Vitals Were     Pulse Temperature Respirations Height Pulse Oximetry BMI (Body Mass Index)    69 97.9  F (36.6  C) (Oral) 16 1.855 m (6' 1.03\") 98% 25.86 kg/m2       Blood Pressure from Last 3 Encounters:   18 125/74    Weight from Last 3 Encounters:   18 89 kg (196 lb 3.2 oz)              We Performed the Following     GASTROENTEROLOGY ADULT REF PROCEDURE ONLY        Primary Care Provider Fax #    Physician No Ref-Primary [phone]       No address on file        Equal Access to Services     EVAN OVIEDO : Hadii mary Krause, wajairoda anne, qaybta kaalmada fito, derik drummond . So [facility] [phone].    ATENCIÓN: Si habla español, tiene a agee disposición servicios gratuitos de asistencia lingüística. Marleneame al [phone].    We comply with applicable federal civil rights laws and [place] laws. We do not discriminate on the basis of race, color, national origin, age, disability, sex, sexual orientation, or gender identity.            Thank you!     Thank you for choosing [facility] GASTROENTEROLOGY AND IBD CLINIC  for your care. Our goal is always to provide you with excellent care. Hearing back from our patients is one way we can continue to improve our services. Please take a few minutes to complete the written survey that you may receive in the mail " after your visit with us. Thank you!             Your Updated Medication List - Protect others around you: Learn how to safely use, store and throw away your medicines at www.disposemymeds.org.          This list is accurate as of 9/18/18  4:31 PM.  Always use your most recent med list.                   Brand Name Dispense Instructions for use Diagnosis    MERCAPTOPURINE PO      Take 50 mg by mouth        mesalamine 800 MG EC tablet    ASACOL HD     Take 2,400 mg by mouth 2 times daily        PREDNISONE PO      Take 30 mg by mouth daily

## 2018-09-18 NOTE — LETTER
Date:September 20, 2018      Patient was self referred, no letter generated. Do not send.        [facility] Information

## 2018-09-19 LAB
DEPRECATED CALCIDIOL+CALCIFEROL SERPL-MC: 31 UG/L (ref 20–75)
HBV CORE AB SERPL QL IA: NONREACTIVE
HBV SURFACE AB SERPL IA-ACNC: 1.63 M[IU]/ML
HBV SURFACE AG SERPL QL IA: NONREACTIVE

## 2018-09-21 LAB
GAMMA INTERFERON BACKGROUND BLD IA-ACNC: 0.06 IU/ML
M TB IFN-G BLD-IMP: NEGATIVE
M TB IFN-G CD4+ BCKGRND COR BLD-ACNC: 5.24 IU/ML
MITOGEN IGNF BCKGRD COR BLD-ACNC: 0 IU/ML
MITOGEN IGNF BCKGRD COR BLD-ACNC: 0 IU/ML

## 2018-09-22 DIAGNOSIS — K51.911 ULCERATIVE COLITIS WITH RECTAL BLEEDING, UNSPECIFIED LOCATION (H): ICD-10-CM

## 2018-09-22 LAB
C COLI+JEJUNI+LARI FUSA STL QL NAA+PROBE: NOT DETECTED
C DIFF TOX B STL QL: NEGATIVE
EC STX1 GENE STL QL NAA+PROBE: NOT DETECTED
EC STX2 GENE STL QL NAA+PROBE: NOT DETECTED
ENTERIC PATHOGEN COMMENT: NORMAL
NOROV GI+II ORF1-ORF2 JNC STL QL NAA+PR: NOT DETECTED
RVA NSP5 STL QL NAA+PROBE: NOT DETECTED
SALMONELLA SP RPOD STL QL NAA+PROBE: NOT DETECTED
SHIGELLA SP+EIEC IPAH STL QL NAA+PROBE: NOT DETECTED
SPECIMEN SOURCE: NORMAL
V CHOL+PARA RFBL+TRKH+TNAA STL QL NAA+PR: NOT DETECTED
Y ENTERO RECN STL QL NAA+PROBE: NOT DETECTED

## 2018-09-24 ENCOUNTER — TELEPHONE (OUTPATIENT)
Dept: GASTROENTEROLOGY | Facility: CLINIC | Age: 28
End: 2018-09-24

## 2018-09-24 DIAGNOSIS — Z12.11 ENCOUNTER FOR SCREENING COLONOSCOPY: Primary | ICD-10-CM

## 2018-09-24 LAB
G LAMBLIA AG STL QL IA: NORMAL
O+P STL MICRO: NORMAL
O+P STL MICRO: NORMAL
SPECIMEN SOURCE: NORMAL
SPECIMEN SOURCE: NORMAL

## 2018-09-24 NOTE — TELEPHONE ENCOUNTER
Patient scheduled for colonoscopy     Indication for procedure. Ulcerative colitis with rectal bleeding, unspecified location     Referring Provider. Dorys Ruiz MD. Internal referral.     ? No     Arrival time verified? 830 am     Facility location verified? [address] [facility], 5th floor     Instructions given regarding prep and procedure    Prep Type Golytely.     Are you taking any anticoagulants or blood thinners? Denies     Instructions given? Yes     Electronic implanted devices? Denies     Pre procedure teaching completed? Yes    Transportation from procedure? Yes     H&P / Pre op physical completed? N/A    Jonah Dorantes RN

## 2018-09-25 ENCOUNTER — RADIANT APPOINTMENT (OUTPATIENT)
Dept: BONE DENSITY | Facility: CLINIC | Age: 28
End: 2018-09-25
Attending: INTERNAL MEDICINE
Payer: COMMERCIAL

## 2018-09-25 ENCOUNTER — TELEPHONE (OUTPATIENT)
Dept: GASTROENTEROLOGY | Facility: CLINIC | Age: 28
End: 2018-09-25

## 2018-09-25 DIAGNOSIS — Z92.241 H/O SYSTEMIC STEROID THERAPY: ICD-10-CM

## 2018-09-25 DIAGNOSIS — K51.911 ULCERATIVE COLITIS WITH RECTAL BLEEDING, UNSPECIFIED LOCATION (H): ICD-10-CM

## 2018-09-25 NOTE — TELEPHONE ENCOUNTER
Called patient and informed him via voicemail that his Entvyio  has been approved through his insurance company. The infusion centers number was left for the patient on voicemail, plus my number was left for the patient if she had any other question or concerns.

## 2018-09-25 NOTE — TELEPHONE ENCOUNTER
----- Message from Edda Luke sent at 9/20/2018 12:56 PM CDT -----  Regarding: RE: PA please  Gregory has been approved for Derek.       ----- Message -----     From: Sapna Guzman RN     Sent: 9/20/2018   8:45 AM       To: Edda Luke, [facility]  Subject: RE: PA please                                    HI,    Looks like Dr. Ruiz was able to complete the encounter.    Thanks    Sapna  ----- Message -----     From: Edda Luke     Sent: 9/19/2018  10:22 AM       To: Sapna Guzman RN, [facility]  Subject: RE: PA please                                    Hello,    Once the office visit encounter from yesterday is completed we can submit the prior authorization. Without that we don't have much clinical information to submit.    Thanks,    Edda Luke  Infusion   [address] [address]  Phone [phone] Fax [phone]  [email or web address]   [email or web address]   Connect with [facility] on social media.    ----- Message -----     From: Sapna Guzman RN     Sent: 9/18/2018   5:33 PM       To: Sapna Guzman RN, [facility]  Subject: PA please                                        Hello,    Would like to start Entyvio infusions on this patient.     Thanks,    Sapna

## 2018-09-26 LAB — CALPROTECTIN STL-MCNT: 241.4 MG/KG (ref 0–49.9)

## 2018-09-28 ENCOUNTER — TELEPHONE (OUTPATIENT)
Dept: GASTROENTEROLOGY | Facility: CLINIC | Age: 28
End: 2018-09-28

## 2018-09-28 NOTE — TELEPHONE ENCOUNTER
Patient called and asked about his infusion spacing and about co pay.Patient has a work trip coming up and was worried about the spacing of the infusion.  Patient was informed that he needed to contact his insurance company for the exact co pay amount.     All questions were answered to the patient satisfaction.

## 2018-10-01 ENCOUNTER — HOSPITAL ENCOUNTER (OUTPATIENT)
Facility: AMBULATORY SURGERY CENTER | Age: 28
End: 2018-10-01
Attending: INTERNAL MEDICINE
Payer: COMMERCIAL

## 2018-10-01 ENCOUNTER — SURGERY (OUTPATIENT)
Age: 28
End: 2018-10-01

## 2018-10-01 VITALS
HEART RATE: 60 BPM | HEIGHT: 73 IN | TEMPERATURE: 97.8 F | OXYGEN SATURATION: 100 % | WEIGHT: 195 LBS | BODY MASS INDEX: 25.84 KG/M2 | SYSTOLIC BLOOD PRESSURE: 131 MMHG | DIASTOLIC BLOOD PRESSURE: 76 MMHG | RESPIRATION RATE: 16 BRPM

## 2018-10-01 LAB — COLONOSCOPY: NORMAL

## 2018-10-01 RX ORDER — LIDOCAINE 40 MG/G
CREAM TOPICAL
Status: DISCONTINUED | OUTPATIENT
Start: 2018-10-01 | End: 2018-10-01 | Stop reason: HOSPADM

## 2018-10-01 RX ORDER — ONDANSETRON 2 MG/ML
4 INJECTION INTRAMUSCULAR; INTRAVENOUS
Status: DISCONTINUED | OUTPATIENT
Start: 2018-10-01 | End: 2018-10-01 | Stop reason: HOSPADM

## 2018-10-01 RX ORDER — ONDANSETRON 4 MG/1
4 TABLET, ORALLY DISINTEGRATING ORAL EVERY 6 HOURS PRN
Status: DISCONTINUED | OUTPATIENT
Start: 2018-10-01 | End: 2018-10-02 | Stop reason: HOSPADM

## 2018-10-01 RX ORDER — FENTANYL CITRATE 50 UG/ML
INJECTION, SOLUTION INTRAMUSCULAR; INTRAVENOUS PRN
Status: DISCONTINUED | OUTPATIENT
Start: 2018-10-01 | End: 2018-10-01 | Stop reason: HOSPADM

## 2018-10-01 RX ORDER — FLUMAZENIL 0.1 MG/ML
0.2 INJECTION, SOLUTION INTRAVENOUS
Status: ACTIVE | OUTPATIENT
Start: 2018-10-01 | End: 2018-10-01

## 2018-10-01 RX ORDER — ONDANSETRON 2 MG/ML
4 INJECTION INTRAMUSCULAR; INTRAVENOUS EVERY 6 HOURS PRN
Status: DISCONTINUED | OUTPATIENT
Start: 2018-10-01 | End: 2018-10-02 | Stop reason: HOSPADM

## 2018-10-01 RX ORDER — NALOXONE HYDROCHLORIDE 0.4 MG/ML
.1-.4 INJECTION, SOLUTION INTRAMUSCULAR; INTRAVENOUS; SUBCUTANEOUS
Status: DISCONTINUED | OUTPATIENT
Start: 2018-10-01 | End: 2018-10-02 | Stop reason: HOSPADM

## 2018-10-01 RX ADMIN — FENTANYL CITRATE 25 MCG: 50 INJECTION, SOLUTION INTRAMUSCULAR; INTRAVENOUS at 10:05

## 2018-10-01 RX ADMIN — FENTANYL CITRATE 50 MCG: 50 INJECTION, SOLUTION INTRAMUSCULAR; INTRAVENOUS at 09:57

## 2018-10-01 RX ADMIN — FENTANYL CITRATE 25 MCG: 50 INJECTION, SOLUTION INTRAMUSCULAR; INTRAVENOUS at 10:01

## 2018-10-02 LAB — COPATH REPORT: NORMAL

## 2018-10-09 ENCOUNTER — DOCUMENTATION ONLY (OUTPATIENT)
Dept: GASTROENTEROLOGY | Facility: CLINIC | Age: 28
End: 2018-10-09

## 2018-10-11 ENCOUNTER — INFUSION THERAPY VISIT (OUTPATIENT)
Dept: INFUSION THERAPY | Facility: CLINIC | Age: 28
End: 2018-10-11
Attending: INTERNAL MEDICINE
Payer: COMMERCIAL

## 2018-10-11 VITALS
SYSTOLIC BLOOD PRESSURE: 143 MMHG | OXYGEN SATURATION: 100 % | BODY MASS INDEX: 26.36 KG/M2 | DIASTOLIC BLOOD PRESSURE: 70 MMHG | TEMPERATURE: 97.7 F | HEART RATE: 50 BPM | WEIGHT: 199.8 LBS | RESPIRATION RATE: 16 BRPM

## 2018-10-11 DIAGNOSIS — Z92.241 H/O SYSTEMIC STEROID THERAPY: ICD-10-CM

## 2018-10-11 DIAGNOSIS — K51.911 ULCERATIVE COLITIS WITH RECTAL BLEEDING, UNSPECIFIED LOCATION (H): Primary | ICD-10-CM

## 2018-10-11 LAB
ALBUMIN SERPL-MCNC: 4 G/DL (ref 3.4–5)
ALP SERPL-CCNC: 38 U/L (ref 40–150)
ALT SERPL W P-5'-P-CCNC: 33 U/L (ref 0–70)
AST SERPL W P-5'-P-CCNC: 18 U/L (ref 0–45)
BASOPHILS # BLD AUTO: 0 10E9/L (ref 0–0.2)
BASOPHILS NFR BLD AUTO: 0.6 %
BILIRUB DIRECT SERPL-MCNC: 0.2 MG/DL (ref 0–0.2)
BILIRUB SERPL-MCNC: 1.1 MG/DL (ref 0.2–1.3)
CRP SERPL-MCNC: <2.9 MG/L (ref 0–8)
DIFFERENTIAL METHOD BLD: NORMAL
EOSINOPHIL # BLD AUTO: 0.1 10E9/L (ref 0–0.7)
EOSINOPHIL NFR BLD AUTO: 2.3 %
ERYTHROCYTE [DISTWIDTH] IN BLOOD BY AUTOMATED COUNT: 12.7 % (ref 10–15)
ERYTHROCYTE [SEDIMENTATION RATE] IN BLOOD BY WESTERGREN METHOD: 3 MM/H (ref 0–15)
HCT VFR BLD AUTO: 44.7 % (ref 40–53)
HGB BLD-MCNC: 15 G/DL (ref 13.3–17.7)
IMM GRANULOCYTES # BLD: 0 10E9/L (ref 0–0.4)
IMM GRANULOCYTES NFR BLD: 0.4 %
LYMPHOCYTES # BLD AUTO: 1.4 10E9/L (ref 0.8–5.3)
LYMPHOCYTES NFR BLD AUTO: 26.8 %
MCH RBC QN AUTO: 30.7 PG (ref 26.5–33)
MCHC RBC AUTO-ENTMCNC: 33.6 G/DL (ref 31.5–36.5)
MCV RBC AUTO: 92 FL (ref 78–100)
MONOCYTES # BLD AUTO: 0.5 10E9/L (ref 0–1.3)
MONOCYTES NFR BLD AUTO: 9.2 %
NEUTROPHILS # BLD AUTO: 3.2 10E9/L (ref 1.6–8.3)
NEUTROPHILS NFR BLD AUTO: 60.7 %
NRBC # BLD AUTO: 0 10*3/UL
NRBC BLD AUTO-RTO: 0 /100
PLATELET # BLD AUTO: 165 10E9/L (ref 150–450)
PROT SERPL-MCNC: 7.1 G/DL (ref 6.8–8.8)
RBC # BLD AUTO: 4.88 10E12/L (ref 4.4–5.9)
WBC # BLD AUTO: 5.3 10E9/L (ref 4–11)

## 2018-10-11 PROCEDURE — 25000128 H RX IP 250 OP 636: Mod: ZF | Performed by: INTERNAL MEDICINE

## 2018-10-11 PROCEDURE — 85025 COMPLETE CBC W/AUTO DIFF WBC: CPT | Performed by: INTERNAL MEDICINE

## 2018-10-11 PROCEDURE — 85652 RBC SED RATE AUTOMATED: CPT | Performed by: INTERNAL MEDICINE

## 2018-10-11 PROCEDURE — 80076 HEPATIC FUNCTION PANEL: CPT | Performed by: INTERNAL MEDICINE

## 2018-10-11 PROCEDURE — 86140 C-REACTIVE PROTEIN: CPT | Performed by: INTERNAL MEDICINE

## 2018-10-11 PROCEDURE — 96365 THER/PROPH/DIAG IV INF INIT: CPT

## 2018-10-11 RX ADMIN — VEDOLIZUMAB 300 MG: 300 INJECTION, POWDER, LYOPHILIZED, FOR SOLUTION INTRAVENOUS at 17:03

## 2018-10-11 NOTE — MR AVS SNAPSHOT
After Visit Summary   10/11/2018    Derek Otto .    MRN: [id number]           Patient Information     Date Of Birth          1990        Visit Information        Provider Department      10/11/2018 4:00 PM UC 41 ATC; UC SPEC INFUSION [facility] Specialty and Procedure        Today's Diagnoses     Ulcerative colitis with rectal bleeding, unspecified location (H)    -  1    H/O systemic steroid therapy          Care Instructions    Dear Derek PATE Clarita Sorenson    Thank you for choosing [facility] ([facility]) for your infusion.  The following information is a summary of our appointment as well as important reminders.      EDUCATION POST BIOLOGICAL/CHEMOTHERAPY INFUSION  Call the triage nurse at your clinic or seek medical attention if you have chills and/or temperature greater than or equal to 100.5, uncontrolled nausea/vomiting, diarrhea, constipation, dizziness, shortness of breath, chest pain, heart palpitations, weakness or any other new or concerning symptoms, questions or concerns.  You can not have any live virus vaccines prior to or during treatment or up to 6 months post infusion.  If you have an upcoming surgery, medical procedure or dental procedure during treatment, this should be discussed with your ordering physician and your surgeon/dentist.  If you are having any concerning symptom, if you are unsure if you should get your next infusion or wish to speak to a provider before your next infusion, please call your care coordinator or triage nurse at your clinic to notify them so we can adequately serve you.      Additional information: you received an infusion of Entyvio 300 mg via IV today.       We look forward in seeing you on your next appointment here at [facility].  Please don t hesitate to call us at [phone] to reschedule any of your appointments or to speak with one of the [facility] registered  nurses.  It was a pleasure taking care of you today.    Sincerely,    [facility]  Specialty Infusion & Procedure Center  [address] [place] [address]  [address]  Phone:  [phone]      Vedolizumab Solution for injection  What is this medicine?  VEDOLIZUMAB (Ve bee DAYAN you mab) is used to treat ulcerative colitis and Crohn's disease in adult patients.  This medicine may be used for other purposes; ask your health care provider or pharmacist if you have questions.  What should I tell my health care provider before I take this medicine?  They need to know if you have any of these conditions:    diabetes    hepatitis B or history of hepatitis B infection    HIV or AIDS    immune system problems    infection or history of infections    liver disease    recently received or scheduled to receive a vaccine    scheduled to have surgery    tuberculosis, a positive skin test for tuberculosis or have recently been in close contact with someone who has tuberculosis    an unusual or allergic reaction to vedolizumab, other medicines, foods, dyes, or preservatives    pregnant or trying to get pregnant    breast-feeding  How should I use this medicine?  This medicine is for infusion into a vein. It is given by a health care professional in a hospital or clinic setting.  A special MedGuide will be given to you by the pharmacist with each prescription and refill. Be sure to read this information carefully each time.  Talk to your pediatrician regarding the use of this medicine in children. This medicine is not approved for use in children.  Overdosage: If you think you've taken too much of this medicine contact a poison control center or emergency room at once.  NOTE: This medicine is only for you. Do not share this medicine with others.  What if I miss a dose?  It is important not to miss your dose. Call your doctor or health care professional if you are unable to keep an appointment.  What may  interact with this medicine?    steroid medicines like prednisone or cortisone    TNF-alpha inhibitors like natalizumab, adalimumab, and infliximab    vaccines  This list may not describe all possible interactions. Give your health care provider a list of all the medicines, herbs, non-prescription drugs, or dietary supplements you use. Also tell them if you smoke, drink alcohol, or use illegal drugs. Some items may interact with your medicine.  What should I watch for while using this medicine?  Your condition will be monitored carefully while you are receiving this medicine. Visit your doctor for regular check ups. Tell your doctor or healthcare professional if your symptoms do not start to get better or if they get worse.  Stay away from people who are sick. Call your doctor or health care professional for advice if you get a fever, chills or sore throat, or other symptoms of a cold or flu. Do not treat yourself.  In some patients, this medicine may cause a serious brain infection that may cause death. If you have any problems seeing, thinking, speaking, walking, or standing, tell your doctor right away. If you cannot reach your doctor, get urgent medical care.  What side effects may I notice from receiving this medicine?  Side effects that you should report to your doctor or health care professional as soon as possible:    allergic reactions like skin rash, itching or hives, swelling of the face, lips, or tongue    breathing problems    changes in vision    chest pain    dark urine    depression, feelings of sadness    dizziness    general ill feeling or flu-like symptoms    irregular, missed, or painful menstrual periods    light-colored stools    loss of appetite, nausea    muscle weakness    problems with balance, talking, or walking    right upper belly pain    unusually weak or tired    yellowing of the eyes or skin  Side effects that usually do not require medical attention (Report these to your doctor or  health care professional if they continue or are bothersome.):    aches, pains    headache    stomach upset    tiredness  This list may not describe all possible side effects. Call your doctor for medical advice about side effects. You may report side effects to FDA at 1-805-NLN-2322.  Where should I keep my medicine?  This drug is given in a hospital or clinic and will not be stored at home.  NOTE: This sheet is a summary. It may not cover all possible information. If you have questions about this medicine, talk to your doctor, pharmacist, or health care provider.  NOTE:This sheet is a summary. It may not cover all possible information. If you have questions about this medicine, talk to your doctor, pharmacist, or health care provider. Copyright  2016 [employer] Standard                  Follow-ups after your visit        Your next 10 appointments already scheduled     Oct 26, 2018  3:00 PM CDT   Infusion 120 with UC SPEC INFUSION, UC 51 ATC   [facility] Specialty and Procedure ([facility])    [address] [place] [address]  [address]  [facility] [address] [phone]            Nov 20, 2018  4:20 PM CST   (Arrive by 4:05 PM)   RETURN INFLAMMATORY BOWEL DISEASE with Dorys Ruiz MD   [facility] ([facility])    [address] [place] [address]  4th Floor  [facility] [address] [phone]              Future tests that were ordered for you today     Open Standing Orders        Priority Remaining Interval Expires Ordered    Notify Physician Routine [id number] PRN  10/11/2018            Who to contact     If you have questions or need follow up information about today's clinic visit or your schedule please contact [facility] SPECIALTY AND PROCEDURE directly at [phone].  Normal or non-critical lab and imaging results will be communicated to you by MyChart, letter or phone within 4 business days  after the clinic has received the results. If you do not hear from us within 7 days, please contact the clinic through [employer] or phone. If you have a critical or abnormal lab result, we will notify you by phone as soon as possible.  Submit refill requests through [employer] or call your pharmacy and they will forward the refill request to us. Please allow 3 business days for your refill to be completed.          Additional Information About Your Visit        [email or web address] Information     [employer] gives you secure access to your electronic health record. If you see a primary care provider, you can also send messages to your care team and make appointments. If you have questions, please call your primary care clinic.  If you do not have a primary care provider, please call [phone] and they will assist you.        Care EveryWhere ID     This is your Care EveryWhere ID. This could be used by other organizations to access your [place] medical records  [id number]        Your Vitals Were     Pulse Temperature Respirations Pulse Oximetry BMI (Body Mass Index)       50 97.7  F (36.5  C) (Oral) 16 100% 26.36 kg/m2        Blood Pressure from Last 3 Encounters:   10/11/18 124/65   10/01/18 131/76   09/18/18 125/74    Weight from Last 3 Encounters:   10/11/18 90.6 kg (199 lb 12.8 oz)   10/01/18 88.5 kg (195 lb)   09/18/18 89 kg (196 lb 3.2 oz)              Today, you had the following     No orders found for display       Primary Care Provider Fax #    Physician No Ref-Primary [phone]       No address on file        Equal Access to Services     EVAN OVIEDO : Hadii aad ku hadasho Soomaali, waaxda luqadaha, qaybta kaalmada adeegyada, derik drummond . So [facility] [phone].    ATENCIÓN: Si habla español, tiene a agee disposición servicios gratuitos de asistencia lingüística. Llame al [phone].    We comply with applicable federal civil rights laws and [place] laws. We do not discriminate on the  basis of race, color, national origin, age, disability, sex, sexual orientation, or gender identity.            Thank you!     Thank you for choosing [facility] SPECIALTY AND PROCEDURE  for your care. Our goal is always to provide you with excellent care. Hearing back from our patients is one way we can continue to improve our services. Please take a few minutes to complete the written survey that you may receive in the mail after your visit with us. Thank you!             Your Updated Medication List - Protect others around you: Learn how to safely use, store and throw away your medicines at www.disposemymeds.org.          This list is accurate as of 10/11/18  5:31 PM.  Always use your most recent med list.                   Brand Name Dispense Instructions for use Diagnosis    influenza Vac Split High-Dose 0.5 ML injection    FLUZONE     Inject 0.5 mLs into the muscle        MERCAPTOPURINE PO      Take 50 mg by mouth        mesalamine 800 MG EC tablet    ASACOL HD     Take 2,400 mg by mouth 2 times daily        PREDNISONE PO      Take 30 mg by mouth daily

## 2018-10-11 NOTE — PATIENT INSTRUCTIONS
Dear Derek Otto Jr.    Thank you for choosing [facility] Specialty Infusion [facility] Procedure [facility] ([facility]) for your infusion.  The following information is a summary of our appointment as well as important reminders.      EDUCATION POST BIOLOGICAL/CHEMOTHERAPY INFUSION  Call the triage nurse at your clinic or seek medical attention if you have chills and/or temperature greater than or equal to 100.5, uncontrolled nausea/vomiting, diarrhea, constipation, dizziness, shortness of breath, chest pain, heart palpitations, weakness or any other new or concerning symptoms, questions or concerns.  You can not have any live virus vaccines prior to or during treatment or up to 6 months post infusion.  If you have an upcoming surgery, medical procedure or dental procedure during treatment, this should be discussed with your ordering physician and your surgeon/dentist.  If you are having any concerning symptom, if you are unsure if you should get your next infusion or wish to speak to a provider before your next infusion, please call your care coordinator or triage nurse at your clinic to notify them so we can adequately serve you.      Additional information: you received an infusion of Entyvio 300 mg via IV today.       We look forward in seeing you on your next appointment here at [facility].  Please don t hesitate to call us at [phone] to reschedule any of your appointments or to speak with one of the [facility] registered nurses.  It was a pleasure taking care of you today.    Sincerely,    [facility]  Specialty Infusion & Procedure Center  [address] [place] [address]  [address]  Phone:  [phone]      Vedolizumab Solution for injection  What is this medicine?  VEDOLIZUMAB (Ve bee DAYAN you mab) is used to treat ulcerative colitis and Crohn's disease in adult patients.  This medicine may be used for other purposes; ask your health care provider or pharmacist if you  have questions.  What should I tell my health care provider before I take this medicine?  They need to know if you have any of these conditions:    diabetes    hepatitis B or history of hepatitis B infection    HIV or AIDS    immune system problems    infection or history of infections    liver disease    recently received or scheduled to receive a vaccine    scheduled to have surgery    tuberculosis, a positive skin test for tuberculosis or have recently been in close contact with someone who has tuberculosis    an unusual or allergic reaction to vedolizumab, other medicines, foods, dyes, or preservatives    pregnant or trying to get pregnant    breast-feeding  How should I use this medicine?  This medicine is for infusion into a vein. It is given by a health care professional in a hospital or clinic setting.  A special MedGuide will be given to you by the pharmacist with each prescription and refill. Be sure to read this information carefully each time.  Talk to your pediatrician regarding the use of this medicine in children. This medicine is not approved for use in children.  Overdosage: If you think you've taken too much of this medicine contact a poison control center or emergency room at once.  NOTE: This medicine is only for you. Do not share this medicine with others.  What if I miss a dose?  It is important not to miss your dose. Call your doctor or health care professional if you are unable to keep an appointment.  What may interact with this medicine?    steroid medicines like prednisone or cortisone    TNF-alpha inhibitors like natalizumab, adalimumab, and infliximab    vaccines  This list may not describe all possible interactions. Give your health care provider a list of all the medicines, herbs, non-prescription drugs, or dietary supplements you use. Also tell them if you smoke, drink alcohol, or use illegal drugs. Some items may interact with your medicine.  What should I watch for while using this  medicine?  Your condition will be monitored carefully while you are receiving this medicine. Visit your doctor for regular check ups. Tell your doctor or healthcare professional if your symptoms do not start to get better or if they get worse.  Stay away from people who are sick. Call your doctor or health care professional for advice if you get a fever, chills or sore throat, or other symptoms of a cold or flu. Do not treat yourself.  In some patients, this medicine may cause a serious brain infection that may cause death. If you have any problems seeing, thinking, speaking, walking, or standing, tell your doctor right away. If you cannot reach your doctor, get urgent medical care.  What side effects may I notice from receiving this medicine?  Side effects that you should report to your doctor or health care professional as soon as possible:    allergic reactions like skin rash, itching or hives, swelling of the face, lips, or tongue    breathing problems    changes in vision    chest pain    dark urine    depression, feelings of sadness    dizziness    general ill feeling or flu-like symptoms    irregular, missed, or painful menstrual periods    light-colored stools    loss of appetite, nausea    muscle weakness    problems with balance, talking, or walking    right upper belly pain    unusually weak or tired    yellowing of the eyes or skin  Side effects that usually do not require medical attention (Report these to your doctor or health care professional if they continue or are bothersome.):    aches, pains    headache    stomach upset    tiredness  This list may not describe all possible side effects. Call your doctor for medical advice about side effects. You may report side effects to FDA at 6-585-FDA-8655.  Where should I keep my medicine?  This drug is given in a hospital or clinic and will not be stored at home.  NOTE: This sheet is a summary. It may not cover all possible information. If you have  questions about this medicine, talk to your doctor, pharmacist, or health care provider.  NOTE:This sheet is a summary. It may not cover all possible information. If you have questions about this medicine, talk to your doctor, pharmacist, or health care provider. Copyright  2016 Gold Standard

## 2018-10-11 NOTE — PROGRESS NOTES
~~~ NOTE: If the patient answers yes to any of the questions below, hold the infusion and contact ordering provider or on-call provider.    1. Have you recently had an elevated temperature, fever, chills, productive cough, coughing for 3 weeks or longer or hemoptysis,  abnormal vital signs, night sweats,  chest pain or have you noticed a decrease in your appetite, unexplained weight loss or fatigue? No  2. Do you have any open wounds or new incisions? No  3. Do you have any recent or upcoming hospitalizations, surgeries or dental procedures? No  4. Do you currently have or recently have had any signs of illness or infection or are you on any antibiotics? No  5. Have you had any new, sudden or worsening abdominal pain? No  6. Have you or anyone in your household received a live vaccination in the past 4 weeks? Please note:  No live vaccines while on biologic/chemotherapy until 6 months after the last treatment.  Patient can receive the flu vaccine (shot only) and the pneumovax.  It is optimal for the patient to get these vaccines mid cycle, but they can be given at any time as long as it is not on the day of the infusion. No  7. Have you recently been diagnosed with any new nervous system diseases (ie. Multiple sclerosis, Guillain Quincy, seizures, neurological changes) or cancer diagnosis? Are you on any form of radiation or chemotherapy? No  8. Are you pregnant or breast feeding or do you have plans of pregnancy in the future? No  9. Have you been having any signs of worsening depression or suicidal ideations?  (benlysta only) No  10. Have there been any other new onset medical symptoms? No      Nursing Note  Derek Otto Jr. [facility] for:   Chief Complaint   Patient presents with     Infusion     Entyvio     During today's [facility] appointment, orders from Dr. Ruiz were completed.  Frequency: week 0, 2, 6, then q 8 weeks; today  is dose #1     Progress note:  Patient identification verified by name and date of birth.  Assessment completed.  Vitals recorded in Doc Flowsheets.  Patient was provided with education regarding infusion and possible side effects.  Patient verbalized understanding.      needed: No  Premedications: were not ordered.  Infusion Rates: infusion given over approximately 30 minutes.  Approximate Infusion length:1 hours.   Labs: were drawn per orders.   Vascular access: peripheral IV placed today.  Treatment Conditions: Biologic checklist performed prior to infusion; patient denies fever, chills, signs of infection, recent illness, on antibiotics, productive cough or elevated temperature.  Patient tolerated infusion: well.          Discharge Plan:   Follow up plan of care with: ongoing infusions at [facility].  Discharge instructions were reviewed with patient.  Patient/representative verbalized understanding of discharge instructions and all questions answered.  Patient discharged from [facility] in stable condition.    Jaquelin Boyce RN    Administrations This Visit     vedolizumab (ENTYVIO) 300 mg in sodium chloride 0.9 % 280 mL infusion     Admin Date Action Dose Rate Route Administered By          10/11/2018 New Bag 300 mg 560 mL/hr Intravenous Jaquelin Boyce RN                         /65  Pulse 50  Temp 97.7  F (36.5  C) (Oral)  Resp 16  Wt 90.6 kg (199 lb 12.8 oz)  SpO2 100%  BMI 26.36 kg/m2

## 2018-10-25 ENCOUNTER — INFUSION THERAPY VISIT (OUTPATIENT)
Dept: INFUSION THERAPY | Facility: CLINIC | Age: 28
End: 2018-10-25
Attending: INTERNAL MEDICINE
Payer: COMMERCIAL

## 2018-10-25 VITALS
HEART RATE: 53 BPM | BODY MASS INDEX: 26.23 KG/M2 | DIASTOLIC BLOOD PRESSURE: 61 MMHG | RESPIRATION RATE: 16 BRPM | TEMPERATURE: 97.5 F | SYSTOLIC BLOOD PRESSURE: 120 MMHG | WEIGHT: 198.8 LBS | OXYGEN SATURATION: 100 %

## 2018-10-25 DIAGNOSIS — Z92.241 H/O SYSTEMIC STEROID THERAPY: ICD-10-CM

## 2018-10-25 DIAGNOSIS — K51.911 ULCERATIVE COLITIS WITH RECTAL BLEEDING, UNSPECIFIED LOCATION (H): Primary | ICD-10-CM

## 2018-10-25 LAB
ALBUMIN SERPL-MCNC: 4.1 G/DL (ref 3.4–5)
ALP SERPL-CCNC: 42 U/L (ref 40–150)
ALT SERPL W P-5'-P-CCNC: 27 U/L (ref 0–70)
AST SERPL W P-5'-P-CCNC: 20 U/L (ref 0–45)
BASOPHILS # BLD AUTO: 0 10E9/L (ref 0–0.2)
BASOPHILS NFR BLD AUTO: 0.4 %
BILIRUB DIRECT SERPL-MCNC: 0.2 MG/DL (ref 0–0.2)
BILIRUB SERPL-MCNC: 1 MG/DL (ref 0.2–1.3)
CRP SERPL-MCNC: <2.9 MG/L (ref 0–8)
DIFFERENTIAL METHOD BLD: NORMAL
EOSINOPHIL # BLD AUTO: 0.1 10E9/L (ref 0–0.7)
EOSINOPHIL NFR BLD AUTO: 1.3 %
ERYTHROCYTE [DISTWIDTH] IN BLOOD BY AUTOMATED COUNT: 12.4 % (ref 10–15)
ERYTHROCYTE [SEDIMENTATION RATE] IN BLOOD BY WESTERGREN METHOD: 2 MM/H (ref 0–15)
HCT VFR BLD AUTO: 45.2 % (ref 40–53)
HGB BLD-MCNC: 15.3 G/DL (ref 13.3–17.7)
IMM GRANULOCYTES # BLD: 0 10E9/L (ref 0–0.4)
IMM GRANULOCYTES NFR BLD: 0.2 %
LYMPHOCYTES # BLD AUTO: 1.4 10E9/L (ref 0.8–5.3)
LYMPHOCYTES NFR BLD AUTO: 26.8 %
MCH RBC QN AUTO: 30.7 PG (ref 26.5–33)
MCHC RBC AUTO-ENTMCNC: 33.8 G/DL (ref 31.5–36.5)
MCV RBC AUTO: 91 FL (ref 78–100)
MONOCYTES # BLD AUTO: 0.5 10E9/L (ref 0–1.3)
MONOCYTES NFR BLD AUTO: 9.5 %
NEUTROPHILS # BLD AUTO: 3.3 10E9/L (ref 1.6–8.3)
NEUTROPHILS NFR BLD AUTO: 61.8 %
NRBC # BLD AUTO: 0 10*3/UL
NRBC BLD AUTO-RTO: 0 /100
PLATELET # BLD AUTO: 150 10E9/L (ref 150–450)
PROT SERPL-MCNC: 7.2 G/DL (ref 6.8–8.8)
RBC # BLD AUTO: 4.98 10E12/L (ref 4.4–5.9)
WBC # BLD AUTO: 5.3 10E9/L (ref 4–11)

## 2018-10-25 PROCEDURE — 85025 COMPLETE CBC W/AUTO DIFF WBC: CPT | Performed by: INTERNAL MEDICINE

## 2018-10-25 PROCEDURE — 86140 C-REACTIVE PROTEIN: CPT | Performed by: INTERNAL MEDICINE

## 2018-10-25 PROCEDURE — 96365 THER/PROPH/DIAG IV INF INIT: CPT

## 2018-10-25 PROCEDURE — 85652 RBC SED RATE AUTOMATED: CPT | Performed by: INTERNAL MEDICINE

## 2018-10-25 PROCEDURE — 25000128 H RX IP 250 OP 636: Mod: ZF | Performed by: INTERNAL MEDICINE

## 2018-10-25 PROCEDURE — 80076 HEPATIC FUNCTION PANEL: CPT | Performed by: INTERNAL MEDICINE

## 2018-10-25 RX ADMIN — VEDOLIZUMAB 300 MG: 300 INJECTION, POWDER, LYOPHILIZED, FOR SOLUTION INTRAVENOUS at 16:28

## 2018-10-25 ASSESSMENT — PAIN SCALES - GENERAL: PAINLEVEL: NO PAIN (0)

## 2018-10-25 NOTE — PATIENT INSTRUCTIONS
Dear Derek Otto Jr.    Thank you for choosing [facility] ([facility]) for your infusion.  The following information is a summary of our appointment as well as important reminders.          Additional information: call for next appointment     We look forward in seeing you on your next appointment here at [facility].  Please don t hesitate to call us at [phone] to reschedule any of your appointments or to speak with one of the [facility] registered nurses.  It was a pleasure taking care of you today.    Sincerely,    [facility]  [facility]  [address] [place] [address]  [address]  Phone:  [phone]    Vedolizumab injection solution  Brand Name: Entyvio  What is this medicine?  VEDOLIZUMAB (Ve rohit HANLEY you mab) is used to treat ulcerative colitis and Crohn's disease in adult patients.  How should I use this medicine?  This medicine is for infusion into a vein. It is given by a health care professional in a hospital or clinic setting.  A special MedGuide will be given to you by the pharmacist with each prescription and refill. Be sure to read this information carefully each time.  Talk to your pediatrician regarding the use of this medicine in children. This medicine is not approved for use in children.  What side effects may I notice from receiving this medicine?  Side effects that you should report to your doctor or health care professional as soon as possible:    allergic reactions like skin rash, itching or hives, swelling of the face, lips, or tongue    breathing problems    changes in vision    chest pain    dark urine    depression, feelings of sadness    dizziness    general ill feeling or flu-like symptoms    irregular, missed, or painful menstrual periods    light-colored stools    loss of appetite, nausea    muscle weakness    problems with balance, talking, or walking    right upper  belly pain    unusually weak or tired    yellowing of the eyes or skin  Side effects that usually do not require medical attention (report to your doctor or health care professional if they continue or are bothersome):    aches, pains    headache    stomach upset    tiredness  What may interact with this medicine?      steroid medicines like prednisone or cortisone    TNF-alpha inhibitors like natalizumab, adalimumab, and infliximab    vaccines  What if I miss a dose?  It is important not to miss your dose. Call your doctor or health care professional if you are unable to keep an appointment.  Where should I keep my medicine?  This drug is given in a hospital or clinic and will not be stored at home.  What should I tell my health care provider before I take this medicine?  They need to know if you have any of these conditions:    diabetes    hepatitis B or history of hepatitis B infection    HIV or AIDS    immune system problems    infection or history of infections    liver disease    recently received or scheduled to receive a vaccine    scheduled to have surgery    tuberculosis, a positive skin test for tuberculosis or have recently been in close contact with someone who has tuberculosis    an unusual or allergic reaction to vedolizumab, other medicines, foods, dyes, or preservatives    pregnant or trying to get pregnant    breast-feeding  What should I watch for while using this medicine?  Your condition will be monitored carefully while you are receiving this medicine. Visit your doctor for regular check ups. Tell your doctor or healthcare professional if your symptoms do not start to get better or if they get worse.  Stay away from people who are sick. Call your doctor or health care professional for advice if you get a fever, chills or sore throat, or other symptoms of a cold or flu. Do not treat yourself.  In some patients, this medicine may cause a serious brain infection that may cause death. If you have  any problems seeing, thinking, speaking, walking, or standing, tell your doctor right away. If you cannot reach your doctor, get urgent medical care.  NOTE:This sheet is a summary. It may not cover all possible information. If you have questions about this medicine, talk to your doctor, pharmacist, or health care provider. Copyright  2018 [employer]        Vedolizumab injection solution  Brand Name: Entyvio  What is this medicine?  VEDOLIZUMAB (Ve bee DAYAN you mab) is used to treat ulcerative colitis and Crohn's disease in adult patients.  How should I use this medicine?  This medicine is for infusion into a vein. It is given by a health care professional in a hospital or clinic setting.  A special MedGuide will be given to you by the pharmacist with each prescription and refill. Be sure to read this information carefully each time.  Talk to your pediatrician regarding the use of this medicine in children. This medicine is not approved for use in children.  What side effects may I notice from receiving this medicine?  Side effects that you should report to your doctor or health care professional as soon as possible:    allergic reactions like skin rash, itching or hives, swelling of the face, lips, or tongue    breathing problems    changes in vision    chest pain    dark urine    depression, feelings of sadness    dizziness    general ill feeling or flu-like symptoms    irregular, missed, or painful menstrual periods    light-colored stools    loss of appetite, nausea    muscle weakness    problems with balance, talking, or walking    right upper belly pain    unusually weak or tired    yellowing of the eyes or skin  Side effects that usually do not require medical attention (report to your doctor or health care professional if they continue or are bothersome):    aches, pains    headache    stomach upset    tiredness  What may interact with this medicine?      steroid medicines like prednisone or  cortisone    TNF-alpha inhibitors like natalizumab, adalimumab, and infliximab    vaccines  What if I miss a dose?  It is important not to miss your dose. Call your doctor or health care professional if you are unable to keep an appointment.  Where should I keep my medicine?  This drug is given in a hospital or clinic and will not be stored at home.  What should I tell my health care provider before I take this medicine?  They need to know if you have any of these conditions:    diabetes    hepatitis B or history of hepatitis B infection    HIV or AIDS    immune system problems    infection or history of infections    liver disease    recently received or scheduled to receive a vaccine    scheduled to have surgery    tuberculosis, a positive skin test for tuberculosis or have recently been in close contact with someone who has tuberculosis    an unusual or allergic reaction to vedolizumab, other medicines, foods, dyes, or preservatives    pregnant or trying to get pregnant    breast-feeding  What should I watch for while using this medicine?  Your condition will be monitored carefully while you are receiving this medicine. Visit your doctor for regular check ups. Tell your doctor or healthcare professional if your symptoms do not start to get better or if they get worse.  Stay away from people who are sick. Call your doctor or health care professional for advice if you get a fever, chills or sore throat, or other symptoms of a cold or flu. Do not treat yourself.  In some patients, this medicine may cause a serious brain infection that may cause death. If you have any problems seeing, thinking, speaking, walking, or standing, tell your doctor right away. If you cannot reach your doctor, get urgent medical care.  NOTE:This sheet is a summary. It may not cover all possible information. If you have questions about this medicine, talk to your doctor, pharmacist, or health care provider. Copyright  2018 [employer]

## 2018-10-25 NOTE — PROGRESS NOTES
~~~ NOTE: If the patient answers yes to any of the questions below, hold the infusion and contact ordering provider or on-call provider.    1. Have you recently had an elevated temperature, fever, chills, productive cough, coughing for 3 weeks or longer or hemoptysis,  abnormal vital signs, night sweats,  chest pain or have you noticed a decrease in your appetite, unexplained weight loss or fatigue? No  2. Do you have any open wounds or new incisions? No  3. Do you have any recent or upcoming hospitalizations, surgeries or dental procedures? No  4. Do you currently have or recently have had any signs of illness or infection or are you on any antibiotics? No  5. Have you had any new, sudden or worsening abdominal pain? No  6. Have you or anyone in your household received a live vaccination in the past 4 weeks? Please note:  No live vaccines while on biologic/chemotherapy until 6 months after the last treatment.  Patient can receive the flu vaccine (shot only) and the pneumovax.  It is optimal for the patient to get these vaccines mid cycle, but they can be given at any time as long as it is not on the day of the infusion. No  7. Have you recently been diagnosed with any new nervous system diseases (ie. Multiple sclerosis, Guillain [place], seizures, neurological changes) or cancer diagnosis? Are you on any form of radiation or chemotherapy? No  8. Are you pregnant or breast feeding or do you have plans of pregnancy in the future? No  9. Have you been having any signs of worsening depression or suicidal ideations?  (benlysta only) No  10. Have there been any other new onset medical symptoms? No      Nursing Note  Derek Otto Jr. [facility] for:   No chief complaint on file.    During today's [facility] appointment, orders from Dr. Ruiz were completed.  Frequency: week 0, 2, 6, then q 8 weeks; today is dose #1     Progress note:  Patient  identification verified by name and date of birth.  Assessment completed.  Vitals recorded in Doc Flowsheets.  Patient was provided with education regarding infusion and possible side effects.  Patient verbalized understanding.      needed: No  Premedications: were not ordered.  Infusion Rates: infusion given over approximately 30 minutes.  Approximate Infusion:1 hours.   Labs: were drawn per orders.   Vascular access: peripheral IV placed today.  Treatment Conditions: Biologic checklist performed prior to infusion; patient denies fever, chills, signs of infection, recent illness, on antibiotics, productive cough or elevated temperature.  Patient tolerated infusion: well.      Administrations This Visit     vedolizumab (ENTYVIO) 300 mg in sodium chloride 0.9 % 280 mL infusion     Admin Date Action Dose Rate Route Administered By          10/25/2018 New Bag 300 mg 560 mL/hr Intravenous Rachel Long, RN                           Discharge Plan:   Follow up plan of care with: ongoing infusions at [facility].  Discharge instructions were reviewed with patient.  Patient/representative verbalized understanding of discharge instructions and all questions answered.  Patient discharged from [facility] in stable condition.    Rachel Long RN        /61  Pulse 53  Temp 97.5  F (36.4  C) (Oral)  Resp 16  Wt 90.2 kg (198 lb 12.8 oz)  SpO2 100%  BMI 26.23 kg/m2

## 2018-10-25 NOTE — MR AVS SNAPSHOT
After Visit Summary   10/25/2018    Derek Otto .    MRN: [id number]           Patient Information     Date Of Birth          1990        Visit Information        Provider Department      10/25/2018 4:00 PM [facility] ATC; [facility]        Today's Diagnoses     Ulcerative colitis with rectal bleeding, unspecified location (H)    -  1    H/O systemic steroid therapy          Care Instructions      Dear Derek PATE Clarita Sorenson    Thank you for choosing [facility] ([facility]) for your infusion.  The following information is a summary of our appointment as well as important reminders.          Additional information: call for next appointment     We look forward in seeing you on your next appointment here at [facility].  Please don t hesitate to call us at [phone] to reschedule any of your appointments or to speak with one of the [facility] registered nurses.  It was a pleasure taking care of you today.    Sincerely,    [facility]  [facility]  [address] [place] [address]  [address]  Phone:  [phone]    Vedolizumab injection solution  Brand Name: Entyvio  What is this medicine?  VEDOLIZUMAB (Ve bee DAYAN you mab) is used to treat ulcerative colitis and Crohn's disease in adult patients.  How should I use this medicine?  This medicine is for infusion into a vein. It is given by a health care professional in a hospital or clinic setting.  A special MedGuide will be given to you by the pharmacist with each prescription and refill. Be sure to read this information carefully each time.  Talk to your pediatrician regarding the use of this medicine in children. This medicine is not approved for use in children.  What side effects may I notice from receiving this medicine?  Side effects that you should report to your doctor or  health care professional as soon as possible:    allergic reactions like skin rash, itching or hives, swelling of the face, lips, or tongue    breathing problems    changes in vision    chest pain    dark urine    depression, feelings of sadness    dizziness    general ill feeling or flu-like symptoms    irregular, missed, or painful menstrual periods    light-colored stools    loss of appetite, nausea    muscle weakness    problems with balance, talking, or walking    right upper belly pain    unusually weak or tired    yellowing of the eyes or skin  Side effects that usually do not require medical attention (report to your doctor or health care professional if they continue or are bothersome):    aches, pains    headache    stomach upset    tiredness  What may interact with this medicine?      steroid medicines like prednisone or cortisone    TNF-alpha inhibitors like natalizumab, adalimumab, and infliximab    vaccines  What if I miss a dose?  It is important not to miss your dose. Call your doctor or health care professional if you are unable to keep an appointment.  Where should I keep my medicine?  This drug is given in a hospital or clinic and will not be stored at home.  What should I tell my health care provider before I take this medicine?  They need to know if you have any of these conditions:    diabetes    hepatitis B or history of hepatitis B infection    HIV or AIDS    immune system problems    infection or history of infections    liver disease    recently received or scheduled to receive a vaccine    scheduled to have surgery    tuberculosis, a positive skin test for tuberculosis or have recently been in close contact with someone who has tuberculosis    an unusual or allergic reaction to vedolizumab, other medicines, foods, dyes, or preservatives    pregnant or trying to get pregnant    breast-feeding  What should I watch for while using this medicine?  Your condition will be monitored carefully  while you are receiving this medicine. Visit your doctor for regular check ups. Tell your doctor or healthcare professional if your symptoms do not start to get better or if they get worse.  Stay away from people who are sick. Call your doctor or health care professional for advice if you get a fever, chills or sore throat, or other symptoms of a cold or flu. Do not treat yourself.  In some patients, this medicine may cause a serious brain infection that may cause death. If you have any problems seeing, thinking, speaking, walking, or standing, tell your doctor right away. If you cannot reach your doctor, get urgent medical care.  NOTE:This sheet is a summary. It may not cover all possible information. If you have questions about this medicine, talk to your doctor, pharmacist, or health care provider. Copyright  2018 [employer]        Vedolizumab injection solution  Brand Name: Entyvio  What is this medicine?  VEDOLIZUMAB (Ve bee DAYAN you mab) is used to treat ulcerative colitis and Crohn's disease in adult patients.  How should I use this medicine?  This medicine is for infusion into a vein. It is given by a health care professional in a hospital or clinic setting.  A special MedGuide will be given to you by the pharmacist with each prescription and refill. Be sure to read this information carefully each time.  Talk to your pediatrician regarding the use of this medicine in children. This medicine is not approved for use in children.  What side effects may I notice from receiving this medicine?  Side effects that you should report to your doctor or health care professional as soon as possible:    allergic reactions like skin rash, itching or hives, swelling of the face, lips, or tongue    breathing problems    changes in vision    chest pain    dark urine    depression, feelings of sadness    dizziness    general ill feeling or flu-like symptoms    irregular, missed, or painful menstrual periods    light-colored  stools    loss of appetite, nausea    muscle weakness    problems with balance, talking, or walking    right upper belly pain    unusually weak or tired    yellowing of the eyes or skin  Side effects that usually do not require medical attention (report to your doctor or health care professional if they continue or are bothersome):    aches, pains    headache    stomach upset    tiredness  What may interact with this medicine?      steroid medicines like prednisone or cortisone    TNF-alpha inhibitors like natalizumab, adalimumab, and infliximab    vaccines  What if I miss a dose?  It is important not to miss your dose. Call your doctor or health care professional if you are unable to keep an appointment.  Where should I keep my medicine?  This drug is given in a hospital or clinic and will not be stored at home.  What should I tell my health care provider before I take this medicine?  They need to know if you have any of these conditions:    diabetes    hepatitis B or history of hepatitis B infection    HIV or AIDS    immune system problems    infection or history of infections    liver disease    recently received or scheduled to receive a vaccine    scheduled to have surgery    tuberculosis, a positive skin test for tuberculosis or have recently been in close contact with someone who has tuberculosis    an unusual or allergic reaction to vedolizumab, other medicines, foods, dyes, or preservatives    pregnant or trying to get pregnant    breast-feeding  What should I watch for while using this medicine?  Your condition will be monitored carefully while you are receiving this medicine. Visit your doctor for regular check ups. Tell your doctor or healthcare professional if your symptoms do not start to get better or if they get worse.  Stay away from people who are sick. Call your doctor or health care professional for advice if you get a fever, chills or sore throat, or other symptoms of a cold or flu. Do not treat  yourself.  In some patients, this medicine may cause a serious brain infection that may cause death. If you have any problems seeing, thinking, speaking, walking, or standing, tell your doctor right away. If you cannot reach your doctor, get urgent medical care.  NOTE:This sheet is a summary. It may not cover all possible information. If you have questions about this medicine, talk to your doctor, pharmacist, or health care provider. Copyright  2018 [employer]                Follow-ups after your visit        Your next 10 appointments already scheduled     Nov 20, 2018  4:20 PM CST   (Arrive by 4:05 PM)   RETURN INFLAMMATORY BOWEL DISEASE with Dorys Ruiz MD   [facility] ([facility])    [address]  4th Floor  [facility] [address] [phone]              Future tests that were ordered for you today     Open Standing Orders        Priority Remaining Interval Expires Ordered    Notify Physician Routine [id number] PRN  10/25/2018            Who to contact     If you have questions or need follow up information about today's clinic visit or your schedule please contact [facility] [place] [facility] directly at [phone].  Normal or non-critical lab and imaging results will be communicated to you by [employer], letter or phone within 4 business days after the clinic has received the results. If you do not hear from us within 7 days, please contact the clinic through [employer] or phone. If you have a critical or abnormal lab result, we will notify you by phone as soon as possible.  Submit refill requests through Fon or call your pharmacy and they will forward the refill request to us. Please allow 3 business days for your refill to be completed.          Additional Information About Your Visit        Fon Information     Fon gives you secure access to your electronic health record. If you see a primary care  provider, you can also send messages to your care team and make appointments. If you have questions, please call your primary care clinic.  If you do not have a primary care provider, please call [phone] and they will assist you.        Care EveryWhere ID     This is your Care EveryWhere ID. This could be used by other organizations to access your [place] medical records  [id number]        Your Vitals Were     Pulse Temperature Respirations Pulse Oximetry BMI (Body Mass Index)       53 97.5  F (36.4  C) (Oral) 16 100% 26.23 kg/m2        Blood Pressure from Last 3 Encounters:   10/25/18 120/61   10/11/18 143/70   10/01/18 131/76    Weight from Last 3 Encounters:   10/25/18 90.2 kg (198 lb 12.8 oz)   10/11/18 90.6 kg (199 lb 12.8 oz)   10/01/18 88.5 kg (195 lb)              We Performed the Following     CBC with platelets differential     CRP inflammation     Erythrocyte sedimentation rate auto     Hepatic panel        Primary Care Provider Fax #    Physician No Ref-Primary [phone]       No address on file        Equal Access to Services     EVAN OVIEDO : Hadii mary Krause, wajairoda anne, qaybta kaalscout payton, derik drummond . So [facility] [phone].    ATENCIÓN: Si habla español, tiene a agee disposición servicios gratuitos de asistencia lingüística. Tree al [phone].    We comply with applicable federal civil rights laws and [place] laws. We do not discriminate on the basis of race, color, national origin, age, disability, sex, sexual orientation, or gender identity.            Thank you!     Thank you for choosing [facility]  for your care. Our goal is always to provide you with excellent care. Hearing back from our patients is one way we can continue to improve our services. Please take a few minutes to complete the written survey that you may receive in the mail after your visit with us. Thank you!              Your Updated Medication List - Protect others around you: Learn how to safely use, store and throw away your medicines at www.disposemymeds.org.          This list is accurate as of 10/25/18  4:30 PM.  Always use your most recent med list.                   Brand Name Dispense Instructions for use Diagnosis    influenza Vac Split High-Dose 0.5 ML injection    FLUZONE     Inject 0.5 mLs into the muscle        MERCAPTOPURINE PO      Take 50 mg by mouth        mesalamine 800 MG EC tablet    ASACOL HD     Take 2,400 mg by mouth 2 times daily        PREDNISONE PO      Take 30 mg by mouth daily

## 2018-11-13 ENCOUNTER — TELEPHONE (OUTPATIENT)
Dept: GASTROENTEROLOGY | Facility: CLINIC | Age: 28
End: 2018-11-13

## 2018-11-20 ENCOUNTER — OFFICE VISIT (OUTPATIENT)
Dept: GASTROENTEROLOGY | Facility: CLINIC | Age: 28
End: 2018-11-20
Payer: COMMERCIAL

## 2018-11-20 VITALS
HEIGHT: 73 IN | DIASTOLIC BLOOD PRESSURE: 71 MMHG | WEIGHT: 198.2 LBS | OXYGEN SATURATION: 100 % | TEMPERATURE: 97.4 F | BODY MASS INDEX: 26.27 KG/M2 | HEART RATE: 48 BPM | SYSTOLIC BLOOD PRESSURE: 118 MMHG

## 2018-11-20 DIAGNOSIS — K51.20 ULCERATIVE PROCTITIS WITHOUT COMPLICATION (H): Primary | ICD-10-CM

## 2018-11-20 ASSESSMENT — PAIN SCALES - GENERAL: PAINLEVEL: NO PAIN (0)

## 2018-11-20 NOTE — LETTER
Date:November 21, 2018      Patient was self referred, no letter generated. Do not send.        [facility] Health Information

## 2018-11-20 NOTE — PATIENT INSTRUCTIONS
PLAN  ---Will see if we can move up the Entyvio infusion by 1 week, so you're getting your 3rd dose at week #6   ---Referral for nutrition with Dr. Yeboah     Return for follow up in 3 months with Rian Schmitz

## 2018-11-20 NOTE — LETTER
11/20/2018       RE: Derek Otto Jr.  [address]  [address]  [place] [address]     Dear Colleague,    Thank you for referring your patient, Derek Otto Jr., to the [facility] at [facility]. Please see a copy of my visit note below.    [facility]      PATIENT: Derek Otto Jr.    MRN: [id number]    Date of Birth 1990    Tel: [phone] (home)     PCP: No Ref-Primary, Physician     HPI: Mr. Otto is a 27 year old year old male here to establish care for UC; he recently moved to [place] from [place].     UC history  [place] year of [place] ([employer] [place]) developed diarrhea (may not have been bloody) and lost 20 lb. Presented to PCP who advised a liquid diet, which did not help. Was seen by a GI doc in [place] (Dr. Bacon) who performed a cscope that showed UC (extent and severity unknown).  Started on prednisone and Asacol. That summer, things improved. In the Fall, he had worsening symptoms and 6MP was started + Asacol +prednisone.  In 2012, saw Dr. Jay Jay Ayala at [facility] who lowered the 6MP dose. Symptoms were generally manageable until a flare in early 2014, restarted prednisone. In 6/2014 started the SCD (+ medications) and did well. Cscope in 2016 looked good. Flared in 2017 and 6/2018 and now, all managed with prednisone.  Currently on 30 mg prednisone, scheduled to decrease to 20 mg on Monday.     [place] moved to [place] 7/1/2018 (g/f is a med/peds resident here).       PCP located at [facility].     Current UC symptoms  Bowel frequency in day 1-2 (1 week ago was 3-6 times per day, with a lot of blood and urgency)  Bowel frequency in night 0  Urgency of defecation YES some  Blood in stool minimal   General well being 1 = slightly below average  Extracolonic features (multiple select) none    Constitutional symptoms:  Fever NO  Weight loss NO    Other GI symptoms present  non    Total number of IBD surgeries (except perianal): 0    Current IBD Medications:  6MP 50 mg daily  Prednisone 30 mg daily  Asacol 4.8 g daily    Past IBD Medications:   6MP 50 mg daily  Prednisone 30 mg daily  Asacol 4.8 g daily    Interval history, 11/2018    Stopped 6MP on 10/20 or so. Had two Entyvio infusions (10/11 and 10/25). Reports feeling very well; no complaints.     Current UC symptoms  Bowel frequency in day 1-2  Bowel frequency in night 0  Urgency of defecation NO  Blood in stool none   General well being 0 = very well   Extracolonic features (multiple select) none    Excited to propose to his gf, Meghana, tomorrow!    Past Medical History:   Diagnosis Date     Ulcerative colitis (H)         Past Surgical History:   Procedure Laterality Date     COLONOSCOPY N/A 10/1/2018    Procedure: COMBINED COLONOSCOPY, SINGLE OR MULTIPLE BIOPSY/POLYPECTOMY BY BIOPSY;  Colonoscopy;  Surgeon: Dorys Ruiz MD;  Location: [facility] OR     HC TOOTH EXTRACTION W/FORCEP  2005       Social History   Substance Use Topics     Smoking status: Never Smoker     Smokeless tobacco: Never Used     Alcohol use 1.2 - 3.6 oz/week     1 - 3 Shots of liquor, 1 - 3 Glasses of wine per week      Comment: Socially       Family History   Problem Relation Age of Onset     Ulcerative Colitis Maternal Grandfather      Autoimmune Disease No family hx of      Colon Cancer No family hx of        Allergies   Allergen Reactions     Erythromycin Rash     Patient reports happened once as a child.        Outpatient Encounter Prescriptions as of 11/20/2018   Medication Sig Dispense Refill     Vedolizumab (ENTYVIO IV)        influenza Vac Split High-Dose (FLUZONE) 0.5 ML injection Inject 0.5 mLs into the muscle       [DISCONTINUED] MERCAPTOPURINE PO Take 50 mg by mouth       [DISCONTINUED] mesalamine (ASACOL HD) 800 MG EC tablet Take 2,400 mg by mouth 2 times daily       [DISCONTINUED] PREDNISONE PO Take 30 mg by mouth daily       No  "facility-administered encounter medications on file as of 11/20/2018.       NSAID  NO    Review of Systems  Complete 10 System ROS performed. All are negative except as documented below, in the HPI, or in patient questionnaire from today's visit.    1) Constitutional: No fevers, chills, night sweats or malaise, weight loss or gain  2) Skin: No rash  3) Pulmonary: No wheeze, SOB, cough, sputum or hemoptysis  4) Cardiovascular: No Chest pain or palpitations  5) Genitourinary: No blood in urine or dysuria  6) Endocrine: No increased sweating, hunger, thirst or thyroid problems  7) Hematologic: No bruising and easy bleeding  8) Musculoskeletal: no new pain in joints or limitation in ROM  9) Neurologic: No dizziness, paresthesias or weakness or falls  10) Psychiatric:  not depressed/anxious, no sleep problems    PHYSICAL EXAM  Vitals: /71  Pulse (!) 48  Temp 97.4  F (36.3  C) (Oral)  Ht 1.854 m (6' 1\")  Wt 89.9 kg (198 lb 3.2 oz)  SpO2 100%  BMI 26.15 kg/m2    No Pain (0)      Eyes: Sclera anicteric/injected  Ears/nose/mouth/throat: Normal oropharynx without ulcers or exudate, mucus membranes moist, hearing intact  Neck: supple, thyroid normal size  CV: No edema, RRR  Respiratory: Unlabored breathing  Lymph: No axillary, submandibular, supraclavicular or inguinal lymphadenopathy  Abd:  Nondistended, +bs, no hepatosplenomegaly, nontender, no peritoneal signs  Skin: warm, perfused, no jaundice  Psych: Normal affect  MSK: Normal gait     DATA:  Reviewed in detail past documentation, medications and prior workup available in electronic health records or through outside records.    PERTINENT STUDIES:  Endoscopy:   icscope 10/1/2018 showed Hunt 1 proctitis, otherwise normal, including ileum.   cscope in 2016 (Dr. Ayala, [facility]) -- showed healing.     Imaging:  May have had an MRE ([place], [place]) in ~2015. Not sure result.     IMPRESSION:  Mr. Otto is a 27 year old year old here with a history of moderate-severe " UC, currently on Entyvio monotherapy (received 2nd infusion on 10/25) doing well.  He stopped 6MP around 10/20.     # Moderate/severe UC diagnosed 6 years ago now on Entyvio monotherapy, doing well    PLAN:  --Will try to move up Entyvio to the week of 11/25, for appropriate timing of 3rd induction infusion.   --Referral to Scott Yeboah, PhD for nutrition     Misc:  -- Continue to avoid tobacco use  -- Avoid NSAIDs as there is potentially a 25% chance of causing an IBD flare    Health maintenance  --DEXA bone scan normal   --Flu shot UTD   --Pneumococcal vaccine with PCP     Return in about 3 months (around 2/20/2019). with YASMANY Sun MD   of Medicine  Division of Gastroenterology, Hepatology and Nutrition  [facility]    I spent a total of 20 minutes, face to face, was spent with this patient, >50% of which was counseling regarding the above delineated issues.    Answers for HPI/ROS submitted by the patient on 11/17/2018   General Symptoms: No  Skin Symptoms: No  HENT Symptoms: No  EYE SYMPTOMS: No  HEART SYMPTOMS: No  LUNG SYMPTOMS: No  INTESTINAL SYMPTOMS: No  URINARY SYMPTOMS: No  REPRODUCTIVE SYMPTOMS: No  SKELETAL SYMPTOMS: No  BLOOD SYMPTOMS: No  NERVOUS SYSTEM SYMPTOMS: No  MENTAL HEALTH SYMPTOMS: No      Again, thank you for allowing me to participate in the care of your patient.      Sincerely,    Dorys Ruiz MD

## 2018-11-20 NOTE — MR AVS SNAPSHOT
After Visit Summary   11/20/2018    Derek Otto Jr.    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 Yeboah     Return for follow up in 3 months with Rian Schmitz           Follow-ups after your visit        Follow-up notes from your care team     Return in about 3 months (around 2/20/2019).      Your next 10 appointments already scheduled     Dec 08, 2018 10:00 AM CST   Infusion 120 with UC SPEC INFUSION, UC 47 ATC   [facility] Procedure ([facility])    [address] [address] [phone]              Who to contact     Please call your clinic at [phone] to:    Ask questions about your health    Make or cancel appointments    Discuss your medicines    Learn about your test results    Speak to your doctor            Additional Information About Your Visit        Estrada BeisbolharPHRQL Information     [employer] [facility] gives you secure access to your electronic health record. If you see a primary care provider, you can also send messages to your care team and make appointments. If you have questions, please call your primary care clinic.  If you do not have a primary care provider, please call [phone] and they will assist you.      [employer] [facility] is an electronic gateway that provides easy, online access to your medical records. With [employer] [facility], you can request a clinic appointment, read your test results, renew a prescription or communicate with your care team.     To access your existing account,  "please contact your [facility] or call [phone] for assistance.        Care EveryWhere ID     This is your Care EveryWhere ID. This could be used by other organizations to access your [place] medical records  [id number]        Your Vitals Were     Pulse Temperature Height Pulse Oximetry BMI (Body Mass Index)       48 97.4  F (36.3  C) (Oral) 1.854 m (6' 1\") 100% 26.15 kg/m2        Blood Pressure from Last 3 Encounters:   11/20/18 118/71   10/25/18 120/61   10/11/18 143/70    Weight from Last 3 Encounters:   11/20/18 89.9 kg (198 lb 3.2 oz)   10/25/18 90.2 kg (198 lb 12.8 oz)   10/11/18 90.6 kg (199 lb 12.8 oz)              Today, you had the following     No orders found for display         Today's Medication Changes          These changes are accurate as of 11/20/18  5:11 PM.  If you have any questions, ask your nurse or doctor.               Stop taking these medicines if you haven't already. Please contact your care team if you have questions.     MERCAPTOPURINE PO   Stopped by:  Dorys Ruiz MD           mesalamine 800 MG EC tablet   Commonly known as:  ASACOL HD   Stopped by:  Dorys Ruiz MD           PREDNISONE PO   Stopped by:  Dorys Ruiz MD                    Primary Care Provider Fax #    Physician No Ref-Primary [phone]       No address on file        Equal Access to Services     [facility] HospitalTO[facility]IE : Hadii mary Krause, sudheer rodríguez, qaybzhang kaalbrynn rai katerin collins adeharshal alejo. So [facility] [phone].    ATENCIÓN: Si habla español, tiene a agee disposición servicios gratuitos de asistencia lingüística. Llame al [phone].    We comply with applicable federal civil rights laws and [place] laws. We do not discriminate on the basis of race, color, national origin, age, disability, sex, sexual orientation, or gender identity.            Thank you!     Thank you for choosing [facility] GASTROENTEROLOGY [facility] IBD CLINIC  for " your care. Our goal is always to provide you with excellent care. Hearing back from our patients is one way we can continue to improve our services. Please take a few minutes to complete the written survey that you may receive in the mail after your visit with us. Thank you!             Your Updated Medication List - Protect others around you: Learn how to safely use, store and throw away your medicines at www.disposemymeds.org.          This list is accurate as of 11/20/18  5:11 PM.  Always use your most recent med list.                   Brand Name Dispense Instructions for use Diagnosis    ENTYVIO IV           influenza Vac Split High-Dose 0.5 ML injection    FLUZONE     Inject 0.5 mLs into the muscle

## 2018-11-20 NOTE — PROGRESS NOTES
[facility]      PATIENT: Derek Otto Jr.    MRN: [id number]    Date of Birth 1990    Tel: [phone] (home)     PCP: No Ref-Primary, Physician     HPI: Mr. Otot is a 27 year old year old male here to establish care for UC; he recently moved to [place] from [place].     UC history  [place] year of college ([place], [place]) developed diarrhea (may not have been bloody) and lost 20 lb. Presented to PCP who advised a liquid diet, which did not help. Was seen by a GI doc in [place] (Dr. Bacon) who performed a cscope that showed UC (extent and severity unknown).  Started on prednisone and Asacol. That summer, things improved. In the Fall, he had worsening symptoms and 6MP was started + Asacol +prednisone.  In 2012, saw Dr. Jay Jay Ayala at [facility] who lowered the 6MP dose. Symptoms were generally manageable until a flare in early 2014, restarted prednisone. In 6/2014 started the SCD (+ medications) and did well. Cscope in 2016 looked good. Flared in 2017 and 6/2018 and now, all managed with prednisone.  Currently on 30 mg prednisone, scheduled to decrease to 20 mg on Monday.     [place] moved to [place] 7/1/2018 (g/f is a med/peds resident here).       PCP located at [facility] in [place].     Current UC symptoms  Bowel frequency in day 1-2 (1 week ago was 3-6 times per day, with a lot of blood and urgency)  Bowel frequency in night 0  Urgency of defecation YES some  Blood in stool minimal   General well being 1 = slightly below average  Extracolonic features (multiple select) none    Constitutional symptoms:  Fever NO  Weight loss NO    Other GI symptoms present non    Total number of IBD surgeries (except perianal): 0    Current IBD Medications:  6MP 50 mg daily  Prednisone 30 mg daily  Asacol 4.8 g daily    Past IBD Medications:   6MP 50 mg daily  Prednisone 30 mg daily  Asacol 4.8 g daily    Interval history, 11/2018    Stopped 6MP on 10/20 or so. Had two Entyvio infusions  (10/11 and 10/25). Reports feeling very well; no complaints.     Current UC symptoms  Bowel frequency in day 1-2  Bowel frequency in night 0  Urgency of defecation NO  Blood in stool none   General well being 0 = very well   Extracolonic features (multiple select) none    Excited to propose to his gf, Meghana, tomorrow!    Past Medical History:   Diagnosis Date     Ulcerative colitis (H)         Past Surgical History:   Procedure Laterality Date     COLONOSCOPY N/A 10/1/2018    Procedure: COMBINED COLONOSCOPY, SINGLE OR MULTIPLE BIOPSY/POLYPECTOMY BY BIOPSY;  Colonoscopy;  Surgeon: Dorys Ruiz MD;  Location: [facility] OR     HC TOOTH EXTRACTION W/FORCEP  2005       Social History   Substance Use Topics     Smoking status: Never Smoker     Smokeless tobacco: Never Used     Alcohol use 1.2 - 3.6 oz/week     1 - 3 Shots of liquor, 1 - 3 Glasses of wine per week      Comment: Socially       Family History   Problem Relation Age of Onset     Ulcerative Colitis Maternal Grandfather      Autoimmune Disease No family hx of      Colon Cancer No family hx of        Allergies   Allergen Reactions     Erythromycin Rash     Patient reports happened once as a child.        Outpatient Encounter Prescriptions as of 11/20/2018   Medication Sig Dispense Refill     Vedolizumab (ENTYVIO IV)        influenza Vac Split High-Dose (FLUZONE) 0.5 ML injection Inject 0.5 mLs into the muscle       [DISCONTINUED] MERCAPTOPURINE PO Take 50 mg by mouth       [DISCONTINUED] mesalamine (ASACOL HD) 800 MG EC tablet Take 2,400 mg by mouth 2 times daily       [DISCONTINUED] PREDNISONE PO Take 30 mg by mouth daily       No facility-administered encounter medications on file as of 11/20/2018.       NSAID  NO    Review of Systems  Complete 10 System ROS performed. All are negative except as documented below, in the HPI, or in patient questionnaire from today's visit.    1) Constitutional: No fevers, chills, night sweats or malaise, weight loss or  "gain  2) Skin: No rash  3) Pulmonary: No wheeze, SOB, cough, sputum or hemoptysis  4) Cardiovascular: No Chest pain or palpitations  5) Genitourinary: No blood in urine or dysuria  6) Endocrine: No increased sweating, hunger, thirst or thyroid problems  7) Hematologic: No bruising and easy bleeding  8) Musculoskeletal: no new pain in joints or limitation in ROM  9) Neurologic: No dizziness, paresthesias or weakness or falls  10) Psychiatric:  not depressed/anxious, no sleep problems    PHYSICAL EXAM  Vitals: /71  Pulse (!) 48  Temp 97.4  F (36.3  C) (Oral)  Ht 1.854 m (6' 1\")  Wt 89.9 kg (198 lb 3.2 oz)  SpO2 100%  BMI 26.15 kg/m2    No Pain (0)      Eyes: Sclera anicteric/injected  Ears/nose/mouth/throat: Normal oropharynx without ulcers or exudate, mucus membranes moist, hearing intact  Neck: supple, thyroid normal size  CV: No edema, RRR  Respiratory: Unlabored breathing  Lymph: No axillary, submandibular, supraclavicular or inguinal lymphadenopathy  Abd:  Nondistended, +bs, no hepatosplenomegaly, nontender, no peritoneal signs  Skin: warm, perfused, no jaundice  Psych: Normal affect  MSK: Normal gait     DATA:  Reviewed in detail past documentation, medications and prior workup available in electronic health records or through outside records.    PERTINENT STUDIES:  Endoscopy:   icscope 10/1/2018 showed Hunt 1 proctitis, otherwise normal, including ileum.   cscope in 2016 (Dr. Ayala, [facility]) -- showed healing.     Imaging:  May have had an MRE ([place], [place]) in ~2015. Not sure result.     IMPRESSION:  Mr. Otto is a 27 year old year old here with a history of moderate-severe UC, currently on Entyvio monotherapy (received 2nd infusion on 10/25) doing well.  He stopped 6MP around 10/20.     # Moderate/severe UC diagnosed 6 years ago now on Entyvio monotherapy, doing well    PLAN:  --Will try to move up Entyvio to the week of 11/25, for appropriate timing of 3rd induction infusion.   --Referral to " Scott Yeboah, PhD for nutrition     Misc:  -- Continue to avoid tobacco use  -- Avoid NSAIDs as there is potentially a 25% chance of causing an IBD flare    Health maintenance  --DEXA bone scan normal   --Flu shot UTD   --Pneumococcal vaccine with PCP     Return in about 3 months (around 2/20/2019). with YASMANY Sun MD   of Medicine  Division of Gastroenterology, Hepatology and Nutrition  [facility]    I spent a total of 20 minutes, face to face, was spent with this patient, >50% of which was counseling regarding the above delineated issues.    Answers for HPI/ROS submitted by the patient on 11/17/2018   General Symptoms: No  Skin Symptoms: No  HENT Symptoms: No  EYE SYMPTOMS: No  HEART SYMPTOMS: No  LUNG SYMPTOMS: No  INTESTINAL SYMPTOMS: No  URINARY SYMPTOMS: No  REPRODUCTIVE SYMPTOMS: No  SKELETAL SYMPTOMS: No  BLOOD SYMPTOMS: No  NERVOUS SYSTEM SYMPTOMS: No  MENTAL HEALTH SYMPTOMS: No

## 2018-11-20 NOTE — NURSING NOTE
"  Chief Complaint   Patient presents with     RECHECK     8 week follow up     Vitals:    11/20/18 1613   BP: 118/71   Pulse: (!) 48   Temp: 97.4  F (36.3  C)   TempSrc: Oral   SpO2: 100%   Weight: 198 lb 3.2 oz   Height: 6' 1\"     Body mass index is 26.15 kg/(m^2).  Xiao Raman CMA    "

## 2018-12-02 ENCOUNTER — INFUSION THERAPY VISIT (OUTPATIENT)
Dept: INFUSION THERAPY | Facility: CLINIC | Age: 28
End: 2018-12-02
Attending: INTERNAL MEDICINE
Payer: COMMERCIAL

## 2018-12-02 VITALS
RESPIRATION RATE: 16 BRPM | TEMPERATURE: 98.2 F | DIASTOLIC BLOOD PRESSURE: 81 MMHG | SYSTOLIC BLOOD PRESSURE: 125 MMHG | OXYGEN SATURATION: 98 % | HEART RATE: 68 BPM

## 2018-12-02 DIAGNOSIS — Z92.241 H/O SYSTEMIC STEROID THERAPY: ICD-10-CM

## 2018-12-02 DIAGNOSIS — K51.911 ULCERATIVE COLITIS WITH RECTAL BLEEDING, UNSPECIFIED LOCATION (H): Primary | ICD-10-CM

## 2018-12-02 PROCEDURE — 96365 THER/PROPH/DIAG IV INF INIT: CPT

## 2018-12-02 PROCEDURE — 25000128 H RX IP 250 OP 636: Mod: ZF | Performed by: INTERNAL MEDICINE

## 2018-12-02 RX ADMIN — VEDOLIZUMAB 300 MG: 300 INJECTION, POWDER, LYOPHILIZED, FOR SOLUTION INTRAVENOUS at 10:00

## 2018-12-02 NOTE — PROGRESS NOTES
Nursing Note  Derek Otto Jr. [facility]:   Chief Complaint   Patient presents with     Infusion     IV Entyvio     During today's [facility] appointment, orders from Dr GABRIEL Ruiz were completed.  Frequency: today is 3rd dose; then every 8 weeks    Progress note:  Patient identification verified by name and date of birth.  Assessment completed.  Vitals recorded in Doc Flowsheets.  Patient was provided with education regarding infusion and possible side effects.  Patient verbalized understanding.      needed: No  Premedications: were not ordered.  Infusion Rates: infusion given over approximately 30 minutes.  Labs: were not ordered for this appointment.  Vascular access: peripheral IV placed today.  Treatment Conditions: ~~~ NOTE: If the patient answers yes to any of the questions below, hold the infusion and contact ordering provider or on-call provider.    1. Have you recently had an elevated temperature, fever, chills, productive cough, coughing for 3 weeks or longer or hemoptysis,  abnormal vital signs, night sweats,  chest pain or have you noticed a decrease in your appetite, unexplained weight loss or fatigue? No  2. Do you have any open wounds or new incisions? No  3. Do you have any recent or upcoming hospitalizations, surgeries or dental procedures? No  4. Do you currently have or recently have had any signs of illness or infection or are you on any antibiotics? No  5. Have you had any new, sudden or worsening abdominal pain? No  6. Have you or anyone in your household received a live vaccination in the past 4 weeks? Please note:  No live vaccines while on biologic/chemotherapy until 6 months after the last treatment.  Patient can receive the flu vaccine (shot only) and the pneumovax.  It is optimal for the patient to get these vaccines mid cycle, but they can be given at any time as long as it is not on the day  of the infusion. No  7. Have you recently been diagnosed with any new nervous system diseases (ie. Multiple sclerosis, Guillain Keatchie, seizures, neurological changes) or cancer diagnosis? Are you on any form of radiation or chemotherapy? No  8. Are you pregnant or breast feeding or do you have plans of pregnancy in the future? NA  9. Have you been having any signs of worsening depression or suicidal ideations?  (benlysta only) NA  10. Have there been any other new onset medical symptoms? No    Patient tolerated infusion: well.    Discharge Plan:   Follow up plan of care with: ongoing infusions at [facility].  Discharge instructions were reviewed with patient.  Patient/representative verbalized understanding of discharge instructions and all questions answered.  Patient discharged from [facility] in stable condition.    Soumya Benites RN       Administrations This Visit     vedolizumab (ENTYVIO) 300 mg in sodium chloride 0.9 % 280 mL infusion     Admin Date Action Dose Rate Route Administered By          12/02/2018 New Bag 300 mg 560 mL/hr Intravenous Soumya Benites RN                           /64  Pulse 73  Temp 98.2  F (36.8  C) (Oral)  Resp 16  SpO2 98%

## 2018-12-02 NOTE — MR AVS SNAPSHOT
After Visit Summary   12/2/2018    Derek Otto Jr.    MRN: [id number]           Patient Information     Date Of Birth          1990        Visit Information        Provider Department      12/2/2018 9:00 AM UC [facility] ATC; UC SPEC INFUSION [facility]        Today's Diagnoses     Ulcerative colitis with rectal bleeding, unspecified location (H)    -  1    H/O systemic steroid therapy           Follow-ups after your visit        Future tests that were ordered for you today     Open Standing Orders        Priority Remaining Interval Expires Ordered    Notify Physician Routine [id number] PRN  12/2/2018            Who to contact     If you have questions or need follow up information about today's clinic visit or your schedule please contact [facility] PROCED[facility] directly at [phone].  Normal or non-critical lab and imaging results will be communicated to you by [employer], letter or phone within 4 business days after the clinic has received the results. If you do not hear from us within 7 days, please contact the clinic through [employer] or phone. If you have a critical or abnormal lab result, we will notify you by phone as soon as possible.  Submit refill requests through [employer] or call your pharmacy and they will forward the refill request to us. Please allow 3 business days for your refill to be completed.          Additional Information About Your Visit        [employer] Information     [employer] gives you secure access to your electronic health record. If you see a primary care provider, you can also send messages to your care team and make appointments. If you have questions, please call your primary care clinic.  If you do not have a primary care provider, please call [phone] and they will assist you.        Care EveryWhere ID     This is your Care EveryWhere ID. This could be used by other organizations to  access your [place] medical records  [id number]        Your Vitals Were     Pulse Temperature Respirations Pulse Oximetry          68 98.2  F (36.8  C) (Oral) 16 98%         Blood Pressure from Last 3 Encounters:   12/02/18 125/81   11/20/18 118/71   10/25/18 120/61    Weight from Last 3 Encounters:   11/20/18 89.9 kg (198 lb 3.2 oz)   10/25/18 90.2 kg (198 lb 12.8 oz)   10/11/18 90.6 kg (199 lb 12.8 oz)              Today, you had the following     No orders found for display       Primary Care Provider Fax #    Physician No Ref-Primary [phone]       No address on file        Equal Access to Services     GEOFF OVIEDO : Ang Krause, wanargis rodríguez, qasneha kaalmagavin payton, derik drummond . So [facility] [phone].    ATENCIÓN: Si habla español, tiene a agee disposición servicios gratuitos de asistencia lingüística. Llame al [phone].    We comply with applicable federal civil rights laws and [place] laws. We do not discriminate on the basis of race, color, national origin, age, disability, sex, sexual orientation, or gender identity.            Thank you!     Thank you for choosing [facility] SPECIAL[facility] PROCEDURE  for your care. Our goal is always to provide you with excellent care. Hearing back from our patients is one way we can continue to improve our services. Please take a few minutes to complete the written survey that you may receive in the mail after your visit with us. Thank you!             Your Updated Medication List - Protect others around you: Learn how to safely use, store and throw away your medicines at www.disposemymeds.org.          This list is accurate as of 12/2/18 11:12 AM.  Always use your most recent med list.                   Brand Name Dispense Instructions for use Diagnosis    ENTYVIO IV           influenza Vac Split High-Dose 0.5 ML injection    FLUZONE     Inject 0.5 mLs into the muscle

## 2019-01-27 ENCOUNTER — INFUSION THERAPY VISIT (OUTPATIENT)
Dept: INFUSION THERAPY | Facility: CLINIC | Age: 29
End: 2019-01-27
Attending: INTERNAL MEDICINE
Payer: COMMERCIAL

## 2019-01-27 VITALS
BODY MASS INDEX: 26.16 KG/M2 | DIASTOLIC BLOOD PRESSURE: 69 MMHG | HEART RATE: 65 BPM | TEMPERATURE: 97.6 F | SYSTOLIC BLOOD PRESSURE: 112 MMHG | WEIGHT: 198.3 LBS | OXYGEN SATURATION: 99 %

## 2019-01-27 DIAGNOSIS — K51.911 ULCERATIVE COLITIS WITH RECTAL BLEEDING, UNSPECIFIED LOCATION (H): Primary | ICD-10-CM

## 2019-01-27 DIAGNOSIS — Z92.241 H/O SYSTEMIC STEROID THERAPY: ICD-10-CM

## 2019-01-27 LAB
ALBUMIN SERPL-MCNC: 4.2 G/DL (ref 3.4–5)
ALP SERPL-CCNC: 45 U/L (ref 40–150)
ALT SERPL W P-5'-P-CCNC: 26 U/L (ref 0–70)
AST SERPL W P-5'-P-CCNC: 16 U/L (ref 0–45)
BASOPHILS # BLD AUTO: 0.1 10E9/L (ref 0–0.2)
BASOPHILS NFR BLD AUTO: 0.8 %
BILIRUB DIRECT SERPL-MCNC: 0.2 MG/DL (ref 0–0.2)
BILIRUB SERPL-MCNC: 1 MG/DL (ref 0.2–1.3)
CRP SERPL-MCNC: <2.9 MG/L (ref 0–8)
DIFFERENTIAL METHOD BLD: ABNORMAL
EOSINOPHIL # BLD AUTO: 0.9 10E9/L (ref 0–0.7)
EOSINOPHIL NFR BLD AUTO: 14.6 %
ERYTHROCYTE [DISTWIDTH] IN BLOOD BY AUTOMATED COUNT: 12.1 % (ref 10–15)
ERYTHROCYTE [SEDIMENTATION RATE] IN BLOOD BY WESTERGREN METHOD: 3 MM/H (ref 0–15)
HCT VFR BLD AUTO: 44.8 % (ref 40–53)
HGB BLD-MCNC: 14.9 G/DL (ref 13.3–17.7)
IMM GRANULOCYTES # BLD: 0 10E9/L (ref 0–0.4)
IMM GRANULOCYTES NFR BLD: 0.3 %
LYMPHOCYTES # BLD AUTO: 1.6 10E9/L (ref 0.8–5.3)
LYMPHOCYTES NFR BLD AUTO: 26.5 %
MCH RBC QN AUTO: 29.2 PG (ref 26.5–33)
MCHC RBC AUTO-ENTMCNC: 33.3 G/DL (ref 31.5–36.5)
MCV RBC AUTO: 88 FL (ref 78–100)
MONOCYTES # BLD AUTO: 0.5 10E9/L (ref 0–1.3)
MONOCYTES NFR BLD AUTO: 8 %
NEUTROPHILS # BLD AUTO: 3.1 10E9/L (ref 1.6–8.3)
NEUTROPHILS NFR BLD AUTO: 49.8 %
NRBC # BLD AUTO: 0 10*3/UL
NRBC BLD AUTO-RTO: 0 /100
PLATELET # BLD AUTO: 152 10E9/L (ref 150–450)
PROT SERPL-MCNC: 7.1 G/DL (ref 6.8–8.8)
RBC # BLD AUTO: 5.1 10E12/L (ref 4.4–5.9)
WBC # BLD AUTO: 6.2 10E9/L (ref 4–11)

## 2019-01-27 PROCEDURE — 85025 COMPLETE CBC W/AUTO DIFF WBC: CPT | Performed by: INTERNAL MEDICINE

## 2019-01-27 PROCEDURE — 86140 C-REACTIVE PROTEIN: CPT | Performed by: INTERNAL MEDICINE

## 2019-01-27 PROCEDURE — 96365 THER/PROPH/DIAG IV INF INIT: CPT

## 2019-01-27 PROCEDURE — 25000128 H RX IP 250 OP 636: Mod: ZF | Performed by: INTERNAL MEDICINE

## 2019-01-27 PROCEDURE — 80076 HEPATIC FUNCTION PANEL: CPT | Performed by: INTERNAL MEDICINE

## 2019-01-27 PROCEDURE — 85652 RBC SED RATE AUTOMATED: CPT | Performed by: INTERNAL MEDICINE

## 2019-01-27 RX ADMIN — VEDOLIZUMAB 300 MG: 300 INJECTION, POWDER, LYOPHILIZED, FOR SOLUTION INTRAVENOUS at 09:52

## 2019-01-27 NOTE — PATIENT INSTRUCTIONS
Patient Education     Vedolizumab Solution for injection  What is this medicine?  VEDOLIZUMAB (Ve bee DAYAN you mab) is used to treat ulcerative colitis and Crohn's disease in adult patients.  This medicine may be used for other purposes; ask your health care provider or pharmacist if you have questions.  What should I tell my health care provider before I take this medicine?  They need to know if you have any of these conditions:    diabetes    hepatitis B or history of hepatitis B infection    HIV or AIDS    immune system problems    infection or history of infections    liver disease    recently received or scheduled to receive a vaccine    scheduled to have surgery    tuberculosis, a positive skin test for tuberculosis or have recently been in close contact with someone who has tuberculosis    an unusual or allergic reaction to vedolizumab, other medicines, foods, dyes, or preservatives    pregnant or trying to get pregnant    breast-feeding  How should I use this medicine?  This medicine is for infusion into a vein. It is given by a health care professional in a hospital or clinic setting.  A special MedGuide will be given to you by the pharmacist with each prescription and refill. Be sure to read this information carefully each time.  Talk to your pediatrician regarding the use of this medicine in children. This medicine is not approved for use in children.  Overdosage: If you think you've taken too much of this medicine contact a poison control center or emergency room at once.  NOTE: This medicine is only for you. Do not share this medicine with others.  What if I miss a dose?  It is important not to miss your dose. Call your doctor or health care professional if you are unable to keep an appointment.  What may interact with this medicine?    steroid medicines like prednisone or cortisone    TNF-alpha inhibitors like natalizumab, adalimumab, and infliximab    vaccines  This list may not describe all possible  interactions. Give your health care provider a list of all the medicines, herbs, non-prescription drugs, or dietary supplements you use. Also tell them if you smoke, drink alcohol, or use illegal drugs. Some items may interact with your medicine.  What should I watch for while using this medicine?  Your condition will be monitored carefully while you are receiving this medicine. Visit your doctor for regular check ups. Tell your doctor or healthcare professional if your symptoms do not start to get better or if they get worse.  Stay away from people who are sick. Call your doctor or health care professional for advice if you get a fever, chills or sore throat, or other symptoms of a cold or flu. Do not treat yourself.  In some patients, this medicine may cause a serious brain infection that may cause death. If you have any problems seeing, thinking, speaking, walking, or standing, tell your doctor right away. If you cannot reach your doctor, get urgent medical care.  What side effects may I notice from receiving this medicine?  Side effects that you should report to your doctor or health care professional as soon as possible:    allergic reactions like skin rash, itching or hives, swelling of the face, lips, or tongue    breathing problems    changes in vision    chest pain    dark urine    depression, feelings of sadness    dizziness    general ill feeling or flu-like symptoms    irregular, missed, or painful menstrual periods    light-colored stools    loss of appetite, nausea    muscle weakness    problems with balance, talking, or walking    right upper belly pain    unusually weak or tired    yellowing of the eyes or skin  Side effects that usually do not require medical attention (Report these to your doctor or health care professional if they continue or are bothersome.):    aches, pains    headache    stomach upset    tiredness  This list may not describe all possible side effects. Call your doctor for  medical advice about side effects. You may report side effects to FDA at 3-910-FXZ-3660.  Where should I keep my medicine?  This drug is given in a hospital or clinic and will not be stored at home.  NOTE: This sheet is a summary. It may not cover all possible information. If you have questions about this medicine, talk to your doctor, pharmacist, or health care provider.  NOTE:This sheet is a summary. It may not cover all possible information. If you have questions about this medicine, talk to your doctor, pharmacist, or health care provider. Copyright  2016 Gold Standard

## 2019-01-27 NOTE — PROGRESS NOTES
Nursing Note  Derek Otto Jr. [facility]:   Chief Complaint   Patient presents with     Infusion     Entyvio     During [facility] appointment, orders from Dr. Ruiz were completed.  Frequency: every 8 weeks    Progress note:  Patient identification verified by name and date of birth.  Assessment completed.  Vitals recorded in Doc Flowsheets.  Patient was provided with education regarding infusion and possible side effects.  Patient verbalized understanding.      needed: No  Premedications: were not ordered.  Infusion Rates: 560 ml/hr.  Approximate Infusion length:30 minutes.   Labs: were drawn per orders.   Vascular access: peripheral IV placed today.  Treatment Conditions:   ~~~ NOTE: If the patient answers yes to any of the questions below, hold the infusion and contact ordering provider or on-call provider.    1. Have you recently had an elevated temperature, fever, chills, productive cough, coughing for 3 weeks or longer or hemoptysis,  abnormal vital signs, night sweats,  chest pain or have you noticed a decrease in your appetite, unexplained weight loss or fatigue? No  2. Do you have any open wounds or new incisions? No  3. Do you have any recent or upcoming hospitalizations, surgeries or dental procedures? No  4. Do you currently have or recently have had any signs of illness or infection or are you on any antibiotics? No  5. Have you had any new, sudden or worsening abdominal pain? No  6. Have you or anyone in your household received a live vaccination in the past 4 weeks? Please note:  No live vaccines while on biologic/chemotherapy until 6 months after the last treatment.  Patient can receive the flu vaccine (shot only) and the pneumovax.  It is optimal for the patient to get these vaccines mid cycle, but they can be given at any time as long as it is not on the day of the infusion. No  7. Have you  recently been diagnosed with any new nervous system diseases (ie. Multiple sclerosis, Guillain [place], seizures, neurological changes) or cancer diagnosis? Are you on any form of radiation or chemotherapy? No  8. Are you pregnant or breast feeding or do you have plans of pregnancy in the future? No  9. Have you been having any signs of worsening depression or suicidal ideations?  (benlysta only) No  10. Have there been any other new onset medical symptoms? No    Patient tolerated infusion: well.    Drug Waste Record? No     Discharge Plan:   Follow up plan of care with: [facility]. and primary medical doctor.  Discharge instructions were reviewed with patient.  Patient/representative verbalized understanding of discharge instructions and all questions answered.  [facility] discharged [facility] in stable condition.    Soledad Trevino RN       Administrations This Visit     vedolizumab (ENTYVIO) 300 mg in sodium chloride 0.9 % 280 mL infusion     Admin Date  01/27/2019 Action  New Bag Dose  300 mg Rate  560 mL/hr Route  Intravenous Administered By  Soledad Trevino RN                  /69   Pulse 65   Temp 97.6  F (36.4  C) (Oral)   Wt 89.9 kg (198 lb 4.8 oz)   SpO2 99%   BMI 26.16 kg/m

## 2019-03-28 ENCOUNTER — INFUSION THERAPY VISIT (OUTPATIENT)
Dept: INFUSION THERAPY | Facility: CLINIC | Age: 29
End: 2019-03-28
Attending: INTERNAL MEDICINE
Payer: COMMERCIAL

## 2019-03-28 ENCOUNTER — MEDICAL CORRESPONDENCE (OUTPATIENT)
Dept: HEALTH INFORMATION MANAGEMENT | Facility: CLINIC | Age: 29
End: 2019-03-28

## 2019-03-28 VITALS
DIASTOLIC BLOOD PRESSURE: 74 MMHG | SYSTOLIC BLOOD PRESSURE: 129 MMHG | RESPIRATION RATE: 18 BRPM | HEART RATE: 54 BPM | TEMPERATURE: 97.5 F | BODY MASS INDEX: 26.73 KG/M2 | WEIGHT: 202.6 LBS

## 2019-03-28 DIAGNOSIS — K51.911 ULCERATIVE COLITIS WITH RECTAL BLEEDING, UNSPECIFIED LOCATION (H): Primary | ICD-10-CM

## 2019-03-28 DIAGNOSIS — Z92.241 H/O SYSTEMIC STEROID THERAPY: ICD-10-CM

## 2019-03-28 PROCEDURE — 25000128 H RX IP 250 OP 636: Mod: ZF | Performed by: INTERNAL MEDICINE

## 2019-03-28 PROCEDURE — 25800030 ZZH RX IP 258 OP 636: Mod: ZF | Performed by: INTERNAL MEDICINE

## 2019-03-28 PROCEDURE — 96365 THER/PROPH/DIAG IV INF INIT: CPT

## 2019-03-28 RX ADMIN — VEDOLIZUMAB 300 MG: 300 INJECTION, POWDER, LYOPHILIZED, FOR SOLUTION INTRAVENOUS at 16:30

## 2019-03-28 NOTE — PROGRESS NOTES
Nursing Note  Derek Otto Jr. [facility] for:   Chief Complaint   Patient presents with     Infusion     Entyvio for UC     During [facility]'s [facility] appointment, orders from Dr. Ruiz were completed.  Frequency: every 8 weeks    Progress note:  Patient identification verified by name and date of birth.  Assessment completed.  Vitals recorded in Doc Flowsheets.  Patient was provided with education regarding infusion and possible side effects.  Patient verbalized understanding.      needed: No  Premedications: were not ordered.  Infusion Rates: 560 ml/hr.  Approximate Infusion length:30 minutes.   Labs: were not ordered for this appointment.   Vascular access: peripheral IV placed today.  Treatment Conditions:   ~~~ NOTE: If the patient answers yes to any of the questions below, hold the infusion and contact ordering provider or on-call provider.    1. Have you recently had an elevated temperature, fever, chills, productive cough, coughing for 3 weeks or longer or hemoptysis,  abnormal vital signs, night sweats,  chest pain or have you noticed a decrease in your appetite, unexplained weight loss or fatigue? No  2. Do you have any open wounds or new incisions? No  3. Do you have any recent or upcoming hospitalizations, surgeries or dental procedures? No  4. Do you currently have or recently have had any signs of illness or infection or are you on any antibiotics? No  5. Have you had any new, sudden or worsening abdominal pain? No  6. Have you or anyone in your household received a live vaccination in the past 4 weeks? Please note:  No live vaccines while on biologic/chemotherapy until 6 months after the last treatment.  Patient can receive the flu vaccine (shot only) and the pneumovax.  It is optimal for the patient to get these vaccines mid cycle, but they can be given at any time as long as it is not on the day of the  infusion. No  7. Have you recently been diagnosed with any new nervous system diseases (ie. Multiple sclerosis, Guillain [place], seizures, neurological changes) or cancer diagnosis? Are you on any form of radiation or chemotherapy? No  8. Are you pregnant or breast feeding or do you have plans of pregnancy in the future? No  9. Have you been having any signs of worsening depression or suicidal ideations?  (benlysta only) No  10. Have there been any other new onset medical symptoms? No    Patient tolerated infusion: well.    Administrations This Visit     vedolizumab (ENTYVIO) 300 mg in sodium chloride 0.9 % 280 mL infusion     Admin Date  03/28/2019 Action  New Bag Dose  300 mg Rate  560 mL/hr Route  Intravenous Administered By  Renetta Segundo RN                Drug Waste Record? No     Discharge Plan:   Follow up plan of care with: ongoing [facility]. and primary medical doctor.  Discharge instructions were reviewed with patient.  Patient/representative verbalized understanding of discharge instructions and all questions answered.  Patient discharged [facility] in stable condition.    Renetta Segundo RN             /83   Pulse 57   Temp 97.5  F (36.4  C) (Oral)   Resp 18   Wt 91.9 kg (202 lb 9.6 oz)   BMI 26.73 kg/m

## 2019-05-07 ENCOUNTER — MYC MEDICAL ADVICE (OUTPATIENT)
Dept: GASTROENTEROLOGY | Facility: CLINIC | Age: 29
End: 2019-05-07

## 2019-05-07 DIAGNOSIS — K51.911 ULCERATIVE COLITIS WITH RECTAL BLEEDING, UNSPECIFIED LOCATION (H): Primary | ICD-10-CM

## 2019-05-08 DIAGNOSIS — K51.911 ULCERATIVE COLITIS WITH RECTAL BLEEDING, UNSPECIFIED LOCATION (H): ICD-10-CM

## 2019-05-08 LAB
ALBUMIN SERPL-MCNC: 4.4 G/DL (ref 3.4–5)
ALP SERPL-CCNC: 48 U/L (ref 40–150)
ALT SERPL W P-5'-P-CCNC: 24 U/L (ref 0–70)
AST SERPL W P-5'-P-CCNC: 14 U/L (ref 0–45)
BASOPHILS # BLD AUTO: 0 10E9/L (ref 0–0.2)
BASOPHILS NFR BLD AUTO: 0.5 %
BILIRUB DIRECT SERPL-MCNC: 0.3 MG/DL (ref 0–0.2)
BILIRUB SERPL-MCNC: 1.7 MG/DL (ref 0.2–1.3)
CRP SERPL-MCNC: <2.9 MG/L (ref 0–8)
DIFFERENTIAL METHOD BLD: NORMAL
EOSINOPHIL # BLD AUTO: 0.2 10E9/L (ref 0–0.7)
EOSINOPHIL NFR BLD AUTO: 3.3 %
ERYTHROCYTE [DISTWIDTH] IN BLOOD BY AUTOMATED COUNT: 12.2 % (ref 10–15)
ERYTHROCYTE [SEDIMENTATION RATE] IN BLOOD BY WESTERGREN METHOD: 2 MM/H (ref 0–15)
HCT VFR BLD AUTO: 44.6 % (ref 40–53)
HGB BLD-MCNC: 15 G/DL (ref 13.3–17.7)
IMM GRANULOCYTES # BLD: 0 10E9/L (ref 0–0.4)
IMM GRANULOCYTES NFR BLD: 0.2 %
LYMPHOCYTES # BLD AUTO: 1.8 10E9/L (ref 0.8–5.3)
LYMPHOCYTES NFR BLD AUTO: 28.7 %
MCH RBC QN AUTO: 30.1 PG (ref 26.5–33)
MCHC RBC AUTO-ENTMCNC: 33.6 G/DL (ref 31.5–36.5)
MCV RBC AUTO: 89 FL (ref 78–100)
MONOCYTES # BLD AUTO: 0.6 10E9/L (ref 0–1.3)
MONOCYTES NFR BLD AUTO: 9.2 %
NEUTROPHILS # BLD AUTO: 3.7 10E9/L (ref 1.6–8.3)
NEUTROPHILS NFR BLD AUTO: 58.1 %
NRBC # BLD AUTO: 0 10*3/UL
NRBC BLD AUTO-RTO: 0 /100
PLATELET # BLD AUTO: 166 10E9/L (ref 150–450)
PROT SERPL-MCNC: 7.1 G/DL (ref 6.8–8.8)
RBC # BLD AUTO: 4.99 10E12/L (ref 4.4–5.9)
WBC # BLD AUTO: 6.4 10E9/L (ref 4–11)

## 2019-05-08 NOTE — TELEPHONE ENCOUNTER
Patient returned my voicemail to discuss his symptoms. Patient states that he has not seen an increase in his symptoms today.  He has no fever and feels tired.  Dr Ruiz would like  Him to report to the [place] lab to have labs and stool studies. Patient verbalizes understanding of plan

## 2019-05-08 NOTE — TELEPHONE ENCOUNTER
Attempted to call the patient to discuss his symptoms and plan.  Will update patient via my chart message.     HAILEE victor scanned into the chart.

## 2019-05-09 DIAGNOSIS — K51.911 ULCERATIVE COLITIS WITH RECTAL BLEEDING, UNSPECIFIED LOCATION (H): ICD-10-CM

## 2019-05-09 LAB
C DIFF TOX B STL QL: NEGATIVE
MISCELLANEOUS TEST: NORMAL
SPECIMEN SOURCE: NORMAL

## 2019-05-14 LAB — CALPROTECTIN STL-MCNT: 669.6 MG/KG (ref 0–49.9)

## 2019-05-15 LAB — LAB SCANNED RESULT: NORMAL

## 2019-05-17 NOTE — PROGRESS NOTES
Per Entyvio level infusion visits should be changed to every six weeks.  Ordered per Dr Ruiz. Will request insurance approval.

## 2019-05-21 ENCOUNTER — TELEPHONE (OUTPATIENT)
Dept: GASTROENTEROLOGY | Facility: CLINIC | Age: 29
End: 2019-05-21

## 2019-05-21 NOTE — TELEPHONE ENCOUNTER
Called patient and updated him that his Entyvio was approved for every 6 weeks. Patient verbalized understanding and will schedule his next infusion at 6 weeks

## 2019-05-21 NOTE — TELEPHONE ENCOUNTER
----- Message from Ethan Phoenix sent at 5/21/2019 12:40 PM CDT -----  Raz Alejo,    We have received APPROVAL to provide Entyvio 300mg every 6 weeks.    Please let us know if you have any questions.    Thank you!    Ethan Phoenix - [facility]    - Lead Infusion Therapy   [facility]   [email or web address]  [email or web address]    Office: [phone]  Fax: [phone]    ----- Message -----  From: Phoenix, Ethan  Sent: 5/20/2019   5:50 PM  To: Sapna Guzman RN, #    Hi Sapna,    We would recommend waiting for approval for a couple reasons.    We are required to submit the dosage as the orders currently are entered. This review would likely not take into account the date of the last infusion. Additionally, the Site of Care requirement may not be met.    Hopefully we receive an answer by Wednesday or earlier. We can let you know if we find out by then.    Thanks!    Ethan Phoenix - [facility]    - Lead Infusion Therapy   [facility]   [email or web address]  [email or web address]    Office: [phone]  Fax: [phone]    ----- Message -----  From: Sapna Guzman RN  Sent: 5/20/2019   5:20 PM  To: Ethan Phoenix, [facility]     Hi,    Not sure why he would have a PA somewhere else, he has always received his infusions here at the [facility].  The infusion this week is for his 8 week infusion so don't we already have approval for him to receive the infusion every 8 weeks?    Sapna  ----- Message -----  From: Phoenix, Ethan  Sent: 5/20/2019   9:41 AM  To: Sapna Guzman RN, #    Hi Sapna,    There are a few items we will need to address.    Firstly, the patient has brand new insurance as of May 1st and is scheduled for Entyvio this Thursday. The dosage being off-policy means our request will likely not be approved in time. Would it be possible to postpone the infusion if necessary?    Secondly, the patient's insurance company had an  approval on file with another facility(possibly in [place]?). Can we confirm that the patient will be coming to the [facility] for infusions?    Thirdly, due to the fact that another facility had a recent approval, the insurance may require the patient to transition to a lower cost facility moving forward. Is this someone Dr. Ruiz wants to appeal?    Please let us know how to proceed.    Thank you!    Ethan Phoenix - [facility]    - Lead Infusion Therapy   [facility]   [email or web address]  [email or web address]    Office: [phone]  Fax: [phone]    ----- Message -----  From: Sapna Guzman RN  Sent: 5/17/2019   3:58 PM  To: Sapna Guzman RN, [facility]    [facility] would like to increase the frequency of his Entyvio infusions to every 6 weeks due to low levels and symptoms.    Thanks,  Sapna Guzman RN, BSN  RN care coordinator  [address] [place] [id number]  Phone: [phone]  Fax: [phone]  After hours:[phone]

## 2019-05-22 ENCOUNTER — TELEPHONE (OUTPATIENT)
Dept: GASTROENTEROLOGY | Facility: CLINIC | Age: 29
End: 2019-05-22

## 2019-05-22 NOTE — TELEPHONE ENCOUNTER
Infusion finance team has received authorization from patient insurance for patient to have his infusions in an infusion center. No need to follow up at this time

## 2019-05-22 NOTE — TELEPHONE ENCOUNTER
[facility] Call Center    Phone Message    May a detailed message be left on voicemail: yes    Reason for Call: Other: Neha from [facility] calling to find out if pt would be allowed to receive home infusion. Please call to discuss. This is for the Entyvio.      Action Taken: Message routed to:  [facility] ([facility]): KARAN

## 2019-05-23 ENCOUNTER — INFUSION THERAPY VISIT (OUTPATIENT)
Dept: INFUSION THERAPY | Facility: CLINIC | Age: 29
End: 2019-05-23
Attending: INTERNAL MEDICINE
Payer: COMMERCIAL

## 2019-05-23 VITALS
HEART RATE: 66 BPM | TEMPERATURE: 98.2 F | WEIGHT: 198.6 LBS | OXYGEN SATURATION: 97 % | SYSTOLIC BLOOD PRESSURE: 106 MMHG | BODY MASS INDEX: 26.2 KG/M2 | DIASTOLIC BLOOD PRESSURE: 61 MMHG | RESPIRATION RATE: 18 BRPM

## 2019-05-23 DIAGNOSIS — Z92.241 H/O SYSTEMIC STEROID THERAPY: ICD-10-CM

## 2019-05-23 DIAGNOSIS — K51.911 ULCERATIVE COLITIS WITH RECTAL BLEEDING, UNSPECIFIED LOCATION (H): Primary | ICD-10-CM

## 2019-05-23 PROCEDURE — 25800030 ZZH RX IP 258 OP 636: Mod: ZF | Performed by: INTERNAL MEDICINE

## 2019-05-23 PROCEDURE — 25000128 H RX IP 250 OP 636: Mod: ZF | Performed by: INTERNAL MEDICINE

## 2019-05-23 PROCEDURE — 96365 THER/PROPH/DIAG IV INF INIT: CPT

## 2019-05-23 RX ADMIN — VEDOLIZUMAB 300 MG: 300 INJECTION, POWDER, LYOPHILIZED, FOR SOLUTION INTRAVENOUS at 17:03

## 2019-05-23 NOTE — PROGRESS NOTES
Nursing Note  Derek Otto Jr. [facility] for:   Chief Complaint   Patient presents with     Infusion     Entyvio     During today's [facility] appointment, orders from Dr. Dorys Ruiz were completed.  Frequency: every 6 weeks    Progress note:  Patient identification verified by name and date of birth.  Assessment completed.  Vitals recorded in Doc Flowsheets.  Patient was provided with education regarding infusion and possible side effects.  Patient verbalized understanding.     present during visit today: Not Applicable.    Pre Infusion Conditions: Biologic/Chemo Checklist ~~~ NOTE: If the patient answers yes to any of the questions below, hold the infusion and contact ordering provider or on-call provider.    1. Have you recently had an elevated temperature, fever, chills, productive cough, coughing for 3 weeks or longer or hemoptysis,  abnormal vital signs, night sweats,  chest pain or have you noticed a decrease in your appetite, unexplained weight loss or fatigue? No  2. Do you have any open wounds or new incisions? No  3. Do you have any recent or upcoming hospitalizations, surgeries or dental procedures? No  4. Do you currently have or recently have had any signs of illness or infection or are you on any antibiotics? No  5. Have you had any new, sudden or worsening abdominal pain? No  6. Have you or anyone in your household received a live vaccination in the past 4 weeks? Please note:  No live vaccines while on biologic/chemotherapy until 6 months after the last treatment.  Patient can receive the flu vaccine (shot only) and the pneumovax.  It is optimal for the patient to get these vaccines mid cycle, but they can be given at any time as long as it is not on the day of the infusion. No  7. Have you recently been diagnosed with any new nervous system diseases (ie. Multiple sclerosis, Guillain [place], seizures,  neurological changes) or cancer diagnosis? Are you on any form of radiation or chemotherapy? No  8. Are you pregnant or breast feeding or do you have plans of pregnancy in the future? No  9. Have you been having any signs of worsening depression or suicidal ideations?  (benlysta only) No  10. Have there been any other new onset medical symptoms? No      Premedications: were not ordered.    Drug Waste Record: No    Infusion length and rate:  infusion given over approximately 30 minutes    Labs: were not ordered for this appointment.    Vascular access: peripheral IV placed today.    Post Infusion Assessment:  Patient tolerated infusion without incident.     Discharge Plan:   Follow up plan of care with: ongoing [facility]usions [facility].  Discharge instructions were reviewed with patient.  Patient/representative verbalized understanding of discharge instructions and all questions answered.  Patient discharged from [facility] in stable condition.    Ericka Severson, RN    Administrations This Visit     vedolizumab (ENTYVIO) 300 mg in sodium chloride 0.9 % 280 mL infusion     Admin Date  05/23/2019 Action  New Bag Dose  300 mg Rate  560 mL/hr Route  Intravenous Administered By  Severson, Ericka, RN                /61   Pulse 66   Temp 98.2  F (36.8  C) (Oral)   Resp 18   Wt 90.1 kg (198 lb 9.6 oz)   SpO2 97%   BMI 26.20 kg/m

## 2019-05-29 NOTE — TELEPHONE ENCOUNTER
Health Call Center    Phone Message    May a detailed message be left on voicemail: yes    Reason for Call: Other: Neha from the pt's health plan is calling back. I told her about the prior auth for infusions. Neha is asking the MD to approve the pt having the infusions at a more cost effective setting, like home or elsewhere. When she rcvs the MD approval, she will verify that with the pt and set up the treatments. Please call Neha at [phone], ext. [phone]. This is a confidential vm. Thanks.     Action Taken: Message routed to:  [facility] ([facility] uc med gi

## 2019-06-14 ENCOUNTER — PATIENT OUTREACH (OUTPATIENT)
Dept: GASTROENTEROLOGY | Facility: CLINIC | Age: 29
End: 2019-06-14

## 2019-06-14 NOTE — PROGRESS NOTES
Called patient and talked with him in relation to his symptoms.  Patient reports that he did start to feel better today, not as many stools urgency or blood present. Patient will be in contact over the next week in relation to his symptoms.

## 2019-07-03 ENCOUNTER — INFUSION THERAPY VISIT (OUTPATIENT)
Dept: INFUSION THERAPY | Facility: CLINIC | Age: 29
End: 2019-07-03
Attending: INTERNAL MEDICINE
Payer: COMMERCIAL

## 2019-07-03 VITALS
WEIGHT: 186.6 LBS | SYSTOLIC BLOOD PRESSURE: 111 MMHG | HEART RATE: 65 BPM | DIASTOLIC BLOOD PRESSURE: 62 MMHG | TEMPERATURE: 98.4 F | BODY MASS INDEX: 24.62 KG/M2 | RESPIRATION RATE: 16 BRPM

## 2019-07-03 DIAGNOSIS — K51.911 ULCERATIVE COLITIS WITH RECTAL BLEEDING, UNSPECIFIED LOCATION (H): Primary | ICD-10-CM

## 2019-07-03 DIAGNOSIS — Z92.241 H/O SYSTEMIC STEROID THERAPY: ICD-10-CM

## 2019-07-03 LAB
ALBUMIN SERPL-MCNC: 4.1 G/DL (ref 3.4–5)
ALP SERPL-CCNC: 50 U/L (ref 40–150)
ALT SERPL W P-5'-P-CCNC: 22 U/L (ref 0–70)
AST SERPL W P-5'-P-CCNC: 12 U/L (ref 0–45)
BASOPHILS # BLD AUTO: 0 10E9/L (ref 0–0.2)
BASOPHILS NFR BLD AUTO: 0.4 %
BILIRUB DIRECT SERPL-MCNC: 0.2 MG/DL (ref 0–0.2)
BILIRUB SERPL-MCNC: 1.2 MG/DL (ref 0.2–1.3)
CRP SERPL-MCNC: <2.9 MG/L (ref 0–8)
DIFFERENTIAL METHOD BLD: NORMAL
EOSINOPHIL # BLD AUTO: 0.1 10E9/L (ref 0–0.7)
EOSINOPHIL NFR BLD AUTO: 1.4 %
ERYTHROCYTE [DISTWIDTH] IN BLOOD BY AUTOMATED COUNT: 12.6 % (ref 10–15)
ERYTHROCYTE [SEDIMENTATION RATE] IN BLOOD BY WESTERGREN METHOD: 3 MM/H (ref 0–15)
HCT VFR BLD AUTO: 41.5 % (ref 40–53)
HGB BLD-MCNC: 14.2 G/DL (ref 13.3–17.7)
IMM GRANULOCYTES # BLD: 0 10E9/L (ref 0–0.4)
IMM GRANULOCYTES NFR BLD: 0.4 %
LYMPHOCYTES # BLD AUTO: 1.7 10E9/L (ref 0.8–5.3)
LYMPHOCYTES NFR BLD AUTO: 20.7 %
MCH RBC QN AUTO: 30.3 PG (ref 26.5–33)
MCHC RBC AUTO-ENTMCNC: 34.2 G/DL (ref 31.5–36.5)
MCV RBC AUTO: 89 FL (ref 78–100)
MONOCYTES # BLD AUTO: 0.5 10E9/L (ref 0–1.3)
MONOCYTES NFR BLD AUTO: 6 %
NEUTROPHILS # BLD AUTO: 5.9 10E9/L (ref 1.6–8.3)
NEUTROPHILS NFR BLD AUTO: 71.1 %
NRBC # BLD AUTO: 0 10*3/UL
NRBC BLD AUTO-RTO: 0 /100
PLATELET # BLD AUTO: 183 10E9/L (ref 150–450)
PROT SERPL-MCNC: 7.2 G/DL (ref 6.8–8.8)
RBC # BLD AUTO: 4.69 10E12/L (ref 4.4–5.9)
WBC # BLD AUTO: 8.3 10E9/L (ref 4–11)

## 2019-07-03 PROCEDURE — 96365 THER/PROPH/DIAG IV INF INIT: CPT

## 2019-07-03 PROCEDURE — 25800030 ZZH RX IP 258 OP 636: Mod: ZF | Performed by: INTERNAL MEDICINE

## 2019-07-03 PROCEDURE — 85652 RBC SED RATE AUTOMATED: CPT | Performed by: INTERNAL MEDICINE

## 2019-07-03 PROCEDURE — 86140 C-REACTIVE PROTEIN: CPT | Performed by: INTERNAL MEDICINE

## 2019-07-03 PROCEDURE — 85025 COMPLETE CBC W/AUTO DIFF WBC: CPT | Performed by: INTERNAL MEDICINE

## 2019-07-03 PROCEDURE — 80076 HEPATIC FUNCTION PANEL: CPT | Performed by: INTERNAL MEDICINE

## 2019-07-03 PROCEDURE — 25000128 H RX IP 250 OP 636: Mod: ZF | Performed by: INTERNAL MEDICINE

## 2019-07-03 RX ADMIN — VEDOLIZUMAB 300 MG: 300 INJECTION, POWDER, LYOPHILIZED, FOR SOLUTION INTRAVENOUS at 16:45

## 2019-07-03 NOTE — PROGRESS NOTES
Nursing Note  Derek Otto Jr. presents [facility] for:   Chief Complaint   Patient presents with     Infusion     Entyvio     During [facility]'s [facility] appointment, orders from Dr. Ruiz were completed.  Frequency: every 6 weeks    Progress note:  Patient identification verified by name and date of birth.  Assessment completed.  Vitals recorded in Doc Flowsheets.  Patient was provided with education regarding infusion and possible side effects.  Patient verbalized understanding.     present during visit today: Not Applicable.    Treatment Conditions: ~~~ NOTE: If the patient answers yes to any of the questions below, hold the infusion and contact ordering provider or on-call provider.    1. Have you recently had an elevated temperature, fever, chills, productive cough, coughing for 3 weeks or longer or hemoptysis, abnormal vital signs, night sweats,  chest pain or have you noticed a decrease in your appetite, unexplained weight loss or fatigue? No  2. Do you have any open wounds or new incisions? No  3. Do you have any recent or upcoming hospitalizations, surgeries or dental procedures? No  4. Do you currently have or recently have had any signs of illness or infection or are you on any antibiotics? No  5. Have you had any new, sudden or worsening abdominal pain? Yes, midde of flare  6. Have you or anyone in your household received a live vaccination in the past 4 weeks? Please note:  No live vaccines while on biologic/chemotherapy until 6 months after the last treatment.  Patient can receive the flu vaccine (shot only) and the pneumovax.  It is optimal for the patient to get these vaccines mid cycle, but they can be given at any time as long as it is not on the day of the infusion. No  7. Have you recently been diagnosed with any new nervous system diseases (ie. Multiple sclerosis, Guillain [place], seizures, neurological changes) or  cancer diagnosis? No  8. Are you on any form of radiation or chemotherapy? No  9. Are you pregnant or breast feeding or do you have plans of pregnancy in the future? No  10. Have you been having any signs of worsening depression or suicidal ideations?  (benlysta only) No  11. Have there been any other new onset medical symptoms? No      Premedications: were not ordered.    Drug Waste Record: No    Infusion length and rate:  infusion given over approximately 30 minutes    Labs: were drawn per orders.     Vascular access: peripheral IV placed today.    Post Infusion Assessment:  Patient tolerated infusion without incident.     Discharge Plan:   Follow up plan of care with: ongoing infusions at [facility].  Discharge instructions were reviewed with patient.  Patient/representative verbalized understanding of discharge instructions and all questions answered.  Patient discharged from [facility] in stable condition.    Hallie Rapp RN       Administrations This Visit     vedolizumab (ENTYVIO) 300 mg in sodium chloride 0.9 % 280 mL infusion     Admin Date  07/03/2019 Action  New Bag Dose  300 mg Rate  560 mL/hr Route  Intravenous Administered By  Hallie Rapp, DELISA                /68 (BP Location: Left arm)   Pulse 51   Temp 98.4  F (36.9  C) (Oral)   Resp 16   Wt 84.6 kg (186 lb 9.6 oz)   BMI 24.62 kg/m

## 2019-07-03 NOTE — PATIENT INSTRUCTIONS
Patient Education     Vedolizumab Solution for injection  What is this medicine?  VEDOLIZUMAB (Ve bee DAYAN you mab) is used to treat ulcerative colitis and Crohn's disease in adult patients.  This medicine may be used for other purposes; ask your health care provider or pharmacist if you have questions.  What should I tell my health care provider before I take this medicine?  They need to know if you have any of these conditions:    diabetes    hepatitis B or history of hepatitis B infection    HIV or AIDS    immune system problems    infection or history of infections    liver disease    recently received or scheduled to receive a vaccine    scheduled to have surgery    tuberculosis, a positive skin test for tuberculosis or have recently been in close contact with someone who has tuberculosis    an unusual or allergic reaction to vedolizumab, other medicines, foods, dyes, or preservatives    pregnant or trying to get pregnant    breast-feeding  How should I use this medicine?  This medicine is for infusion into a vein. It is given by a health care professional in a hospital or clinic setting.  A special MedGuide will be given to you by the pharmacist with each prescription and refill. Be sure to read this information carefully each time.  Talk to your pediatrician regarding the use of this medicine in children. This medicine is not approved for use in children.  Overdosage: If you think you've taken too much of this medicine contact a poison control center or emergency room at once.  NOTE: This medicine is only for you. Do not share this medicine with others.  What if I miss a dose?  It is important not to miss your dose. Call your doctor or health care professional if you are unable to keep an appointment.  What may interact with this medicine?    steroid medicines like prednisone or cortisone    TNF-alpha inhibitors like natalizumab, adalimumab, and infliximab    vaccines  This list may not describe all possible  interactions. Give your health care provider a list of all the medicines, herbs, non-prescription drugs, or dietary supplements you use. Also tell them if you smoke, drink alcohol, or use illegal drugs. Some items may interact with your medicine.  What should I watch for while using this medicine?  Your condition will be monitored carefully while you are receiving this medicine. Visit your doctor for regular check ups. Tell your doctor or healthcare professional if your symptoms do not start to get better or if they get worse.  Stay away from people who are sick. Call your doctor or health care professional for advice if you get a fever, chills or sore throat, or other symptoms of a cold or flu. Do not treat yourself.  In some patients, this medicine may cause a serious brain infection that may cause death. If you have any problems seeing, thinking, speaking, walking, or standing, tell your doctor right away. If you cannot reach your doctor, get urgent medical care.  What side effects may I notice from receiving this medicine?  Side effects that you should report to your doctor or health care professional as soon as possible:    allergic reactions like skin rash, itching or hives, swelling of the face, lips, or tongue    breathing problems    changes in vision    chest pain    dark urine    depression, feelings of sadness    dizziness    general ill feeling or flu-like symptoms    irregular, missed, or painful menstrual periods    light-colored stools    loss of appetite, nausea    muscle weakness    problems with balance, talking, or walking    right upper belly pain    unusually weak or tired    yellowing of the eyes or skin  Side effects that usually do not require medical attention (Report these to your doctor or health care professional if they continue or are bothersome.):    aches, pains    headache    stomach upset    tiredness  This list may not describe all possible side effects. Call your doctor for  medical advice about side effects. You may report side effects to FDA at 4-637-BRO-2877.  Where should I keep my medicine?  This drug is given in a hospital or clinic and will not be stored at home.  NOTE: This sheet is a summary. It may not cover all possible information. If you have questions about this medicine, talk to your doctor, pharmacist, or health care provider.  NOTE:This sheet is a summary. It may not cover all possible information. If you have questions about this medicine, talk to your doctor, pharmacist, or health care provider. Copyright  2016 Gold Standard

## 2019-07-11 ENCOUNTER — PATIENT OUTREACH (OUTPATIENT)
Dept: GASTROENTEROLOGY | Facility: CLINIC | Age: 29
End: 2019-07-11

## 2019-07-11 ENCOUNTER — TELEPHONE (OUTPATIENT)
Dept: GASTROENTEROLOGY | Facility: CLINIC | Age: 29
End: 2019-07-11

## 2019-07-11 ENCOUNTER — HOSPITAL ENCOUNTER (OUTPATIENT)
Facility: CLINIC | Age: 29
End: 2019-07-11
Attending: INTERNAL MEDICINE | Admitting: INTERNAL MEDICINE

## 2019-07-11 DIAGNOSIS — R19.7 DIARRHEA: ICD-10-CM

## 2019-07-11 DIAGNOSIS — K51.90 ULCERATIVE COLITIS (H): Primary | ICD-10-CM

## 2019-07-11 NOTE — PROGRESS NOTES
My chart message to pt that Dr. Ruiz ordered stool studies and colonoscopy.          Can we also check a C diff and enteric stool panel again?  Last c diff check was 2 months ago    Let's set him up for a colonoscopy. I can do this anytime I'm attending this week or week of July 22

## 2019-07-13 DIAGNOSIS — R19.7 DIARRHEA: ICD-10-CM

## 2019-07-13 DIAGNOSIS — K51.90 ULCERATIVE COLITIS (H): ICD-10-CM

## 2019-07-13 LAB
C DIFF TOX B STL QL: NEGATIVE
SPECIMEN SOURCE: NORMAL

## 2019-07-15 ENCOUNTER — TELEPHONE (OUTPATIENT)
Dept: GASTROENTEROLOGY | Facility: CLINIC | Age: 29
End: 2019-07-15

## 2019-07-15 DIAGNOSIS — Z12.5 SPECIAL SCREENING FOR MALIGNANT NEOPLASM OF PROSTATE: Primary | ICD-10-CM

## 2019-07-15 RX ORDER — BISACODYL 5 MG/1
5 TABLET, DELAYED RELEASE ORAL DAILY PRN
Qty: 4 TABLET | Refills: 0 | Status: SHIPPED | OUTPATIENT
Start: 2019-07-15 | End: 2019-08-20

## 2019-07-15 NOTE — TELEPHONE ENCOUNTER
Patient Name: Derek Otto Jr.   : 1990  MRN: [id number]       : [x] N/A   [] Yes:  Language   ID:        Sandy Darling, RN  [facility] Endoscopy    Additional Information regarding appointment:      Patient scheduled for:  [] EGD  [x] Colonoscopy  [] EUS  [] Flex Sig       Indication for procedure. [] Screening   [x] Other: Flair up of Ulcerative Colitis    Sedation Type: [x] Conscious Sedation   [] MAC   [] None    Procedure Provider:  Dr. Ruiz      Referring Provider. Dr. Ruiz    Arrival time verified:  / 9:15 AM    Facility location verified:   [][facility] - [address] [place] [address], 1st Floor, Rm [id number]    Pt meets medical necessity for outpatient procedure in hospital Endoscopy Unit:      [x][facility] [address] [place] [address], 5th floor     [][facility] - [facility], 3rd Floor Surgery check-in      Prep Type:   [x]Golytely eRx: Sent to [address] [place] [address];  [] MoviPrep:  , [] MiraLax:  , [] Other:    []NPO /p MN, No solid food /p 2200 the night before    Anticoagulants or blood thinners: [x]None [] ASA 81mg  - may continue           [] Warfarin   [] Warfarin + Lovenox bridge [] Plavix [] Effient [] Eliquis         [] Xarelto  [] Brilinta [] NSAIDS  [] Other     LAST anticoagulant dose: Date/Time:    INR:      Electronic implanted devices: [x] No  [] IPG  []  ICD  []  LVAD  []      H&P / Pre op physical completed: [x] N/A, [] Complete, Date  , [] Scheduled, Date  , [] No,      Additional Information:  Procedure added on  @ 8:30 am and patient was sent instructions via E mail for prep. He did not know about procedure and encouraged him to follow instructions as written for timeline remaining in prep time _______________________________________________      Instructions given: [] Rec'd & Read   [] Reviewed       [] Resent via MyChart -       [x] Resent via eMail -         Pre procedure teaching completed: [] Yes -  Reviewed, [] No,      [x] No questions regarding Sedation as ordered, []      Transportation from procedure & responsible adult to be with patient following procedure for a minimum of 6 hrs (Conscious Sedation) 24 hrs (MAC): [x] Yes   - confirmed will have post-procedure companionship as required, [] Pending  , [] No     Sandy Darling RN  [facility] Endoscopy

## 2019-07-15 NOTE — TELEPHONE ENCOUNTER
[facility] Call Center    Phone Message    May a detailed message be left on voicemail: yes    Reason for Call: Other: Patient received a VM, but it cut out so he couldn't understand. His colonoscopy was cancelled, and he didn't want that, nor was he aware of it. Patient also stated that his colonoscopy was to be done at the [facility]. Writer unsure how to schedule since colonoscopies are normally done at the [facility]. Please call patient back at [phone]     Action Taken: Message routed to:  [facility] ([facility]): GI

## 2019-07-15 NOTE — TELEPHONE ENCOUNTER
Called patient and discussed colonoscopy that is scheduled for tomorrow.  Patient understands it is scheduled for tomorrow here at the [facility] he will arrive 1 hour before scheduled appointment.     Patient is very symptomatic at this time do it to his disease and wanted me to be aware that he is leaving for an extended trip tomorrow evening.  Told him to inform Dr. Doran of his trip tomorrow so medications can be ordered for him if needed

## 2019-07-16 ENCOUNTER — HOSPITAL ENCOUNTER (OUTPATIENT)
Facility: AMBULATORY SURGERY CENTER | Age: 29
End: 2019-07-16
Attending: INTERNAL MEDICINE
Payer: COMMERCIAL

## 2019-07-16 ENCOUNTER — PATIENT OUTREACH (OUTPATIENT)
Dept: GASTROENTEROLOGY | Facility: CLINIC | Age: 29
End: 2019-07-16

## 2019-07-16 VITALS
HEART RATE: 55 BPM | BODY MASS INDEX: 24.41 KG/M2 | OXYGEN SATURATION: 96 % | DIASTOLIC BLOOD PRESSURE: 66 MMHG | TEMPERATURE: 98.9 F | RESPIRATION RATE: 16 BRPM | SYSTOLIC BLOOD PRESSURE: 128 MMHG | WEIGHT: 185 LBS

## 2019-07-16 DIAGNOSIS — K51.911 ULCERATIVE COLITIS WITH RECTAL BLEEDING, UNSPECIFIED LOCATION (H): Primary | ICD-10-CM

## 2019-07-16 LAB — COLONOSCOPY: NORMAL

## 2019-07-16 RX ORDER — LIDOCAINE 40 MG/G
CREAM TOPICAL
Status: DISCONTINUED | OUTPATIENT
Start: 2019-07-16 | End: 2019-07-17 | Stop reason: HOSPADM

## 2019-07-16 RX ORDER — FENTANYL CITRATE 50 UG/ML
INJECTION, SOLUTION INTRAMUSCULAR; INTRAVENOUS PRN
Status: DISCONTINUED | OUTPATIENT
Start: 2019-07-16 | End: 2019-07-16 | Stop reason: HOSPADM

## 2019-07-16 RX ORDER — ONDANSETRON 2 MG/ML
4 INJECTION INTRAMUSCULAR; INTRAVENOUS
Status: DISCONTINUED | OUTPATIENT
Start: 2019-07-16 | End: 2019-07-17 | Stop reason: HOSPADM

## 2019-07-16 RX ORDER — PREDNISONE 5 MG/1
TABLET ORAL
Qty: 228 TABLET | Refills: 0 | Status: SHIPPED | OUTPATIENT
Start: 2019-07-16 | End: 2019-08-22

## 2019-07-16 RX ORDER — SIMETHICONE
LIQUID (ML) MISCELLANEOUS PRN
Status: DISCONTINUED | OUTPATIENT
Start: 2019-07-16 | End: 2019-07-16 | Stop reason: HOSPADM

## 2019-07-16 NOTE — PROGRESS NOTES
Patient had colonoscopy today with Dr. Doran.  After colonoscopy Dr. Doran requested patient start a short taper of prednisone.  Order placed and sent to pharmacy, confirmed order with the pharmacist    Rectal tacrolimus will be ordered for the patient.  And sent to the compounding pharmacy.

## 2019-07-16 NOTE — DISCHARGE INSTRUCTIONS
Discharge Instructions after Colonoscopy      Today you had a ____ Colonoscopy  Activity and Diet  You were given medicine for pain. You may be dizzy or sleepy.  For 24 hours:    Do not drive or use heavy equipment.    Do not make important decisions.    Do not drink any alcohol.  You may return to your normal diet and medicines.    Discomfort    Air was placed in your colon during the exam in order to see it. Walking helps to pass the air.    You may take Tylenol (acetaminophen) for pain unless your doctor has told you not to.  Do not take aspirin or ibuprofen (Advil, Motrin, or other anti-inflammatory  drugs) for _____ days.    Follow-up  ____ We took small tissue samples or polyps to study. Your doctor will call you with the results  within two weeks.    When to call:    Call right away if you have:    Unusual pain in belly or chest pain not relieved with passing air.    More than 1 to 2 Tablespoons of bleeding from your rectum.    Fever above 100.6  F (37.5  C).    If you have severe pain, bleeding, or shortness of breath, go to an emergency room.    If you have questions, call:  Monday to Friday, 7 a.m. to 4:30 p.m.  Endoscopy: [phone] (We may have to call you back)    After hours  Hospital: [phone] (Ask for the GI fellow on call)

## 2019-07-18 LAB — COPATH REPORT: NORMAL

## 2019-07-31 ENCOUNTER — MYC MEDICAL ADVICE (OUTPATIENT)
Dept: GASTROENTEROLOGY | Facility: CLINIC | Age: 29
End: 2019-07-31

## 2019-07-31 DIAGNOSIS — K51.911 ULCERATIVE COLITIS WITH RECTAL BLEEDING, UNSPECIFIED LOCATION (H): ICD-10-CM

## 2019-08-20 ENCOUNTER — PATIENT OUTREACH (OUTPATIENT)
Dept: GASTROENTEROLOGY | Facility: CLINIC | Age: 29
End: 2019-08-20

## 2019-08-20 ENCOUNTER — INFUSION THERAPY VISIT (OUTPATIENT)
Dept: INFUSION THERAPY | Facility: CLINIC | Age: 29
End: 2019-08-20
Attending: INTERNAL MEDICINE
Payer: COMMERCIAL

## 2019-08-20 VITALS
BODY MASS INDEX: 23.62 KG/M2 | OXYGEN SATURATION: 100 % | WEIGHT: 179 LBS | HEART RATE: 56 BPM | TEMPERATURE: 98.3 F | RESPIRATION RATE: 16 BRPM | DIASTOLIC BLOOD PRESSURE: 66 MMHG | SYSTOLIC BLOOD PRESSURE: 109 MMHG

## 2019-08-20 DIAGNOSIS — K51.911 ULCERATIVE COLITIS WITH RECTAL BLEEDING, UNSPECIFIED LOCATION (H): Primary | ICD-10-CM

## 2019-08-20 DIAGNOSIS — Z92.241 H/O SYSTEMIC STEROID THERAPY: ICD-10-CM

## 2019-08-20 PROCEDURE — 25000128 H RX IP 250 OP 636: Mod: ZF | Performed by: INTERNAL MEDICINE

## 2019-08-20 PROCEDURE — 25800030 ZZH RX IP 258 OP 636: Mod: ZF | Performed by: INTERNAL MEDICINE

## 2019-08-20 PROCEDURE — 96365 THER/PROPH/DIAG IV INF INIT: CPT

## 2019-08-20 RX ADMIN — VEDOLIZUMAB 300 MG: 300 INJECTION, POWDER, LYOPHILIZED, FOR SOLUTION INTRAVENOUS at 16:45

## 2019-08-20 NOTE — PATIENT INSTRUCTIONS
Dear Derek Otto Jr.    Thank you for choosing [facility] ([facility]) for your infusion.  The following information is a summary of our appointment as well as important reminders.      EDUCATION POST BIOLOGICAL/CHEMOTHERAPY INFUSION  Call the triage nurse at your clinic or seek medical attention if you have chills and/or temperature greater than or equal to 100.5, uncontrolled nausea/vomiting, diarrhea, constipation, dizziness, shortness of breath, chest pain, heart palpitations, weakness or any other new or concerning symptoms, questions or concerns.  You can not have any live virus vaccines prior to or during treatment or up to 6 months post infusion.  If you have an upcoming surgery, medical procedure or dental procedure during treatment, this should be discussed with your ordering physician and your surgeon/dentist.  If you are having any concerning symptom, if you are unsure if you should get your next infusion or wish to speak to a provider before your next infusion, please call your care coordinator or triage nurse at your clinic to notify them so we can adequately serve you.      We look forward in seeing you on your next appointment here at [facility] ([facility]).  Please don t hesitate to call us at [phone] to reschedule any of your appointments or to speak with one of the [facility] registered nurses.  It was a pleasure taking care of you today.    Sincerely,    [facility]  [facility]  [address] [place] [address]  [address]  Phone:  [phone]

## 2019-08-20 NOTE — PROGRESS NOTES
Nursing Note  Derek Otto Jr. presents today [facility] for:   Chief Complaint   Patient presents with     Infusion     Entyvio     During [facility]'s [facility] appointment, orders from Dr. Ruiz were completed.  Frequency: every 6 weeks    Progress note:  Patient identification verified by name and date of birth.  Assessment completed.  Vitals recorded in Doc Flowsheets.  Patient was provided with education regarding infusion and possible side effects.  Patient verbalized understanding.     present during visit today: Not Applicable.    Treatment Conditions: ~~~ NOTE: If the patient answers yes to any of the questions below, hold the infusion and contact ordering provider or on-call provider.    1. Have you recently had an elevated temperature, fever, chills, productive cough, coughing for 3 weeks or longer or hemoptysis, abnormal vital signs, night sweats,  chest pain or have you noticed a decrease in your appetite, unexplained weight loss or fatigue? No  2. Do you have any open wounds or new incisions? No  3. Do you have any recent or upcoming hospitalizations, surgeries or dental procedures? No  4. Do you currently have or recently have had any signs of illness or infection or are you on any antibiotics? No  5. Have you had any new, sudden or worsening abdominal pain?NO  6. Have you or anyone in your household received a live vaccination in the past 4 weeks? Please note:  No live vaccines while on biologic/chemotherapy until 6 months after the last treatment.  Patient can receive the flu vaccine (shot only) and the pneumovax.  It is optimal for the patient to get these vaccines mid cycle, but they can be given at any time as long as it is not on the day of the infusion. No  7. Have you recently been diagnosed with any new nervous system diseases (ie. Multiple sclerosis, Guillain [place], seizures, neurological changes) or cancer diagnosis?  No  8. Are you on any form of radiation or chemotherapy? No  9. Are you pregnant or breast feeding or do you have plans of pregnancy in the future? No  10. Have you been having any signs of worsening depression or suicidal ideations?  (benlysta only) No  11. Have there been any other new onset medical symptoms? No      Premedications: were not ordered.    Drug Waste Record: No    Infusion length and rate:  infusion given over approximately 30 minutes    Labs: were drawn per orders.     Vascular access: peripheral IV placed today.    Post Infusion Assessment:  Patient tolerated infusion without incident.     Discharge Plan:   Follow up plan of care with: ongoing infusions at [facility].  Discharge instructions were reviewed with patient.  Patient/representative verbalized understanding of discharge instructions and all questions answered.  Patient discharged from [facility] in stable condition.    Judy Gagnon RN    Administrations This Visit     vedolizumab (ENTYVIO) 300 mg in sodium chloride 0.9 % 280 mL infusion     Admin Date  08/20/2019 Action  New Bag Dose  300 mg Rate  560 mL/hr Route  Intravenous Administered By  Judy Gagnon RN                /71   Pulse (!) 49   Temp 98.3  F (36.8  C) (Oral)   Resp 16   Wt 81.2 kg (179 lb)   SpO2 100%   BMI 23.62 kg/m

## 2019-08-21 RX ORDER — HEPARIN SODIUM (PORCINE) LOCK FLUSH IV SOLN 100 UNIT/ML 100 UNIT/ML
5 SOLUTION INTRAVENOUS
Status: CANCELLED | OUTPATIENT
Start: 2019-08-21

## 2019-08-21 RX ORDER — HEPARIN SODIUM,PORCINE 10 UNIT/ML
5 VIAL (ML) INTRAVENOUS
Status: CANCELLED | OUTPATIENT
Start: 2019-08-21

## 2019-08-21 RX ORDER — ACETAMINOPHEN 325 MG/1
650 TABLET ORAL ONCE
Status: CANCELLED
Start: 2019-08-21

## 2019-08-21 RX ORDER — METHYLPREDNISOLONE SODIUM SUCCINATE 125 MG/2ML
125 INJECTION, POWDER, LYOPHILIZED, FOR SOLUTION INTRAMUSCULAR; INTRAVENOUS ONCE
Status: CANCELLED | OUTPATIENT
Start: 2019-08-21

## 2019-08-21 RX ORDER — DIPHENHYDRAMINE HCL 25 MG
25 CAPSULE ORAL ONCE
Status: CANCELLED
Start: 2019-08-21

## 2019-08-21 NOTE — TELEPHONE ENCOUNTER
Dr Ruiz attempted to call patient and left message with direct call back number.     Remicade order placed patients chart and will request PA for the medication patient will be updated when he returns phone call.     Entyvio plan discontinued out of patients chart.

## 2019-08-22 RX ORDER — PREDNISONE 5 MG/1
TABLET ORAL
Qty: 240 TABLET | Refills: 0 | Status: SHIPPED | OUTPATIENT
Start: 2019-08-22

## 2019-08-22 NOTE — TELEPHONE ENCOUNTER
Called patient and discussed patient's change to Remicade.  Patient is agreeable with changing to another medication due to him being very symptomatic at this time. Patient has discontinued the rectal tacrolimus.      Patient will increase his prednisone to 40 mg until he starts his Remicade then decrease by one tablet weekly until off.

## 2019-08-30 ENCOUNTER — TRANSFERRED RECORDS (OUTPATIENT)
Dept: HEALTH INFORMATION MANAGEMENT | Facility: CLINIC | Age: 29
End: 2019-08-30

## 2019-08-30 ENCOUNTER — TELEPHONE (OUTPATIENT)
Dept: GASTROENTEROLOGY | Facility: CLINIC | Age: 29
End: 2019-08-30

## 2019-08-30 NOTE — TELEPHONE ENCOUNTER
Called patient and left voicemail stating that he can call and schedule the Remicade infusions.  Will also send a my chart message.

## 2019-08-30 NOTE — TELEPHONE ENCOUNTER
----- Message from Ethan Phoenix sent at 8/29/2019  2:00 PM CDT -----  Raz Alejo,    We have received APPROVAL to provide Remicade to Dwaine. We are working on correcting a few details that will allow him to remain at the [facility] [place].    Please feel free to schedule patient.    Thank you!    Ethan Phoenix Banner    - [facility]   [facility]   [email or web address]  [email or web address]    Office: [phone]  Fax: [phone]    ----- Message -----  From: Sapna Guzman RN  Sent: 8/27/2019  12:32 PM  To: Ethan Phoenix, [facility]    Have you heard anything in relation to the prior for this patient?    Sapna  ----- Message -----  From: Phoenix, Ethan  Sent: 8/22/2019  12:10 PM  To: Sapna Guzman RN, [facility]    Raz Alejo,    We have submitted a PA to the patient's insurance company. We will let you know once determination has been received.    Thank you!    Ethan Phoenix Banner    - [facility]   [facility]   [email or web address]  [email or web address]    Office: [phone]  Fax: [phone]    ----- Message -----  From: Sapna Guzman RN  Sent: 8/21/2019   5:40 PM  To: Sapna Guzman RN, [facility]    Dr Joseph Rogers would like to start this patient on Remicade.  Patient Entyvio therapy plan discontinued.  He receives his infusions at [facility].    Thanks    Sapna

## 2019-09-10 ENCOUNTER — DOCUMENTATION ONLY (OUTPATIENT)
Dept: GASTROENTEROLOGY | Facility: CLINIC | Age: 29
End: 2019-09-10

## 2019-09-10 NOTE — PROGRESS NOTES
Received incoming mail of letter for approval of Remicade from 8/26/19-10/24/19; has been scanned into chart.

## 2019-09-18 ENCOUNTER — MEDICAL CORRESPONDENCE (OUTPATIENT)
Dept: HEALTH INFORMATION MANAGEMENT | Facility: CLINIC | Age: 29
End: 2019-09-18

## 2019-12-30 DIAGNOSIS — K51.911 ULCERATIVE COLITIS WITH RECTAL BLEEDING, UNSPECIFIED LOCATION (H): ICD-10-CM

## 2019-12-30 RX ORDER — PREDNISONE 5 MG/1
TABLET ORAL
Qty: 207 TABLET | Refills: 0 | OUTPATIENT
Start: 2019-12-30

## 2019-12-30 NOTE — TELEPHONE ENCOUNTER
Denied, dc 8-22-19 8-22-19 telephone encounter  Patient will increase his prednisone to 40 mg until he starts his Remicade then decrease by one tablet weekly until off.

## 2020-03-11 ENCOUNTER — HEALTH MAINTENANCE LETTER (OUTPATIENT)
Age: 30
End: 2020-03-11

## 2020-08-19 NOTE — PROGRESS NOTES
Patient Remicade orders  therapy plan removed from the patents chart due to patient no longer receiving care through the [facility]

## 2021-01-03 ENCOUNTER — HEALTH MAINTENANCE LETTER (OUTPATIENT)
Age: 31
End: 2021-01-03

## 2021-04-25 ENCOUNTER — HEALTH MAINTENANCE LETTER (OUTPATIENT)
Age: 31
End: 2021-04-25

## 2021-10-10 ENCOUNTER — HEALTH MAINTENANCE LETTER (OUTPATIENT)
Age: 31
End: 2021-10-10

## 2022-05-21 ENCOUNTER — HEALTH MAINTENANCE LETTER (OUTPATIENT)
Age: 32
End: 2022-05-21

## 2022-09-18 ENCOUNTER — HEALTH MAINTENANCE LETTER (OUTPATIENT)
Age: 32
End: 2022-09-18

## 2023-05-26 ENCOUNTER — APPOINTMENT (OUTPATIENT)
Dept: URBAN - METROPOLITAN AREA CLINIC 193 | Age: 33
Setting detail: DERMATOLOGY
End: 2023-06-01

## 2023-05-26 DIAGNOSIS — L57.8 OTHER SKIN CHANGES DUE TO CHRONIC EXPOSURE TO NONIONIZING RADIATION: ICD-10-CM

## 2023-05-26 DIAGNOSIS — Z71.89 OTHER SPECIFIED COUNSELING: ICD-10-CM

## 2023-05-26 DIAGNOSIS — D22 MELANOCYTIC NEVI: ICD-10-CM

## 2023-05-26 DIAGNOSIS — L82.1 OTHER SEBORRHEIC KERATOSIS: ICD-10-CM

## 2023-05-26 DIAGNOSIS — L81.4 OTHER MELANIN HYPERPIGMENTATION: ICD-10-CM

## 2023-05-26 PROBLEM — D22.61 MELANOCYTIC NEVI OF RIGHT UPPER LIMB, INCLUDING SHOULDER: Status: ACTIVE | Noted: 2023-05-26

## 2023-05-26 PROCEDURE — OTHER COUNSELING: OTHER

## 2023-05-26 PROCEDURE — 99203 OFFICE O/P NEW LOW 30 MIN: CPT

## 2023-05-26 PROCEDURE — OTHER MIPS QUALITY: OTHER

## 2023-05-26 ASSESSMENT — LOCATION SIMPLE DESCRIPTION DERM
LOCATION SIMPLE: CHEST
LOCATION SIMPLE: RIGHT UPPER ARM
LOCATION SIMPLE: LEFT PRETIBIAL REGION
LOCATION SIMPLE: LEFT ANTERIOR NECK
LOCATION SIMPLE: LEFT SHOULDER

## 2023-05-26 ASSESSMENT — LOCATION ZONE DERM
LOCATION ZONE: LEG
LOCATION ZONE: ARM
LOCATION ZONE: TRUNK
LOCATION ZONE: NECK

## 2023-05-26 ASSESSMENT — LOCATION DETAILED DESCRIPTION DERM
LOCATION DETAILED: LEFT INFERIOR ANTERIOR NECK
LOCATION DETAILED: LEFT PROXIMAL PRETIBIAL REGION
LOCATION DETAILED: RIGHT ANTERIOR PROXIMAL UPPER ARM
LOCATION DETAILED: LEFT MEDIAL SUPERIOR CHEST
LOCATION DETAILED: LEFT POSTERIOR SHOULDER

## 2023-06-04 ENCOUNTER — HEALTH MAINTENANCE LETTER (OUTPATIENT)
Age: 33
End: 2023-06-04

## (undated) DEVICE — GOWN IMPERVIOUS 2XL BLUE

## (undated) DEVICE — SOL WATER IRRIG 1000ML BOTTLE 2F7114

## (undated) DEVICE — ENDO FORCEP ENDOJAW BIOPSY 2.8MMX230CM FB-220U

## (undated) DEVICE — TUBING SUCTION 12"X1/4" N612

## (undated) DEVICE — ENDO CAP AND TUBING STERILE FOR ENDOGATOR  100130

## (undated) DEVICE — SUCTION MANIFOLD NEPTUNE 2 SYS 1 PORT 702-025-000

## (undated) DEVICE — SPECIMEN CONTAINER 3OZ W/FORMALIN 59901

## (undated) DEVICE — SUCTION MANIFOLD NEPTUNE 2 SYS 4 PORT 0702-020-000

## (undated) RX ORDER — FENTANYL CITRATE 50 UG/ML
INJECTION, SOLUTION INTRAMUSCULAR; INTRAVENOUS
Status: DISPENSED
Start: 2019-07-16

## (undated) RX ORDER — FENTANYL CITRATE 50 UG/ML
INJECTION, SOLUTION INTRAMUSCULAR; INTRAVENOUS
Status: DISPENSED
Start: 2018-10-01